# Patient Record
Sex: FEMALE | Race: BLACK OR AFRICAN AMERICAN | NOT HISPANIC OR LATINO
[De-identification: names, ages, dates, MRNs, and addresses within clinical notes are randomized per-mention and may not be internally consistent; named-entity substitution may affect disease eponyms.]

---

## 2017-04-23 ENCOUNTER — RESULT REVIEW (OUTPATIENT)
Age: 57
End: 2017-04-23

## 2017-04-25 ENCOUNTER — RESULT REVIEW (OUTPATIENT)
Age: 57
End: 2017-04-25

## 2018-12-03 ENCOUNTER — OUTPATIENT (OUTPATIENT)
Dept: OUTPATIENT SERVICES | Facility: HOSPITAL | Age: 58
LOS: 1 days | End: 2018-12-03

## 2018-12-03 VITALS
HEART RATE: 68 BPM | WEIGHT: 162.92 LBS | SYSTOLIC BLOOD PRESSURE: 124 MMHG | TEMPERATURE: 99 F | HEIGHT: 63 IN | RESPIRATION RATE: 18 BRPM | DIASTOLIC BLOOD PRESSURE: 82 MMHG

## 2018-12-03 DIAGNOSIS — K40.20 BILATERAL INGUINAL HERNIA, WITHOUT OBSTRUCTION OR GANGRENE, NOT SPECIFIED AS RECURRENT: ICD-10-CM

## 2018-12-03 DIAGNOSIS — M25.522 PAIN IN LEFT ELBOW: Chronic | ICD-10-CM

## 2018-12-03 DIAGNOSIS — Z98.51 TUBAL LIGATION STATUS: Chronic | ICD-10-CM

## 2018-12-03 LAB
BUN SERPL-MCNC: 21 MG/DL — SIGNIFICANT CHANGE UP (ref 7–23)
CALCIUM SERPL-MCNC: 9.9 MG/DL — SIGNIFICANT CHANGE UP (ref 8.4–10.5)
CHLORIDE SERPL-SCNC: 102 MMOL/L — SIGNIFICANT CHANGE UP (ref 98–107)
CO2 SERPL-SCNC: 26 MMOL/L — SIGNIFICANT CHANGE UP (ref 22–31)
CREAT SERPL-MCNC: 0.88 MG/DL — SIGNIFICANT CHANGE UP (ref 0.5–1.3)
GLUCOSE SERPL-MCNC: 84 MG/DL — SIGNIFICANT CHANGE UP (ref 70–99)
HCT VFR BLD CALC: 38.7 % — SIGNIFICANT CHANGE UP (ref 34.5–45)
HGB BLD-MCNC: 12 G/DL — SIGNIFICANT CHANGE UP (ref 11.5–15.5)
MCHC RBC-ENTMCNC: 26.1 PG — LOW (ref 27–34)
MCHC RBC-ENTMCNC: 31 % — LOW (ref 32–36)
MCV RBC AUTO: 84.3 FL — SIGNIFICANT CHANGE UP (ref 80–100)
NRBC # FLD: 0 — SIGNIFICANT CHANGE UP
PLATELET # BLD AUTO: 268 K/UL — SIGNIFICANT CHANGE UP (ref 150–400)
PMV BLD: 9.9 FL — SIGNIFICANT CHANGE UP (ref 7–13)
POTASSIUM SERPL-MCNC: 3.7 MMOL/L — SIGNIFICANT CHANGE UP (ref 3.5–5.3)
POTASSIUM SERPL-SCNC: 3.7 MMOL/L — SIGNIFICANT CHANGE UP (ref 3.5–5.3)
RBC # BLD: 4.59 M/UL — SIGNIFICANT CHANGE UP (ref 3.8–5.2)
RBC # FLD: 13.2 % — SIGNIFICANT CHANGE UP (ref 10.3–14.5)
SODIUM SERPL-SCNC: 142 MMOL/L — SIGNIFICANT CHANGE UP (ref 135–145)
WBC # BLD: 6.04 K/UL — SIGNIFICANT CHANGE UP (ref 3.8–10.5)
WBC # FLD AUTO: 6.04 K/UL — SIGNIFICANT CHANGE UP (ref 3.8–10.5)

## 2018-12-03 NOTE — H&P PST ADULT - NEGATIVE PSYCHIATRIC SYMPTOMS
no memory loss/no agitation/no auditory hallucinations/no hyperactivity/no visual hallucinations/no insomnia/no depression/no paranoia/no suicidal ideation

## 2018-12-03 NOTE — H&P PST ADULT - HISTORY OF PRESENT ILLNESS
59 y/o Black female with PMH: HTN, Hypothyroidism, Dyslipidemia presents to PST for pre op evaluation with h/o right groin pain and "felt a little bubble" summer 2018. Pt was evaluated by PCP who then referred her to surgeon. Pre op diagnosis: bilateral inguinal hernia without obstructions or gangrene. Now scheduled for bilateral inguinal hernia repair on 12/17/18

## 2018-12-03 NOTE — H&P PST ADULT - NEGATIVE ENMT SYMPTOMS
no tinnitus/no nasal congestion/no nasal obstruction/no post-nasal discharge/no nose bleeds/no abnormal taste sensation/no dry mouth/no recurrent cold sores/no hearing difficulty/no gum bleeding/no throat pain/no ear pain/no sinus symptoms/no nasal discharge/no dysphagia

## 2018-12-03 NOTE — H&P PST ADULT - RS GEN PE MLT RESP DETAILS PC
clear to auscultation bilaterally/no chest wall tenderness/breath sounds equal/airway patent airway patent/no chest wall tenderness/respirations non-labored/breath sounds equal/no intercostal retractions/no rales/no wheezes/no rhonchi/no subcutaneous emphysema/good air movement/clear to auscultation bilaterally

## 2018-12-03 NOTE — H&P PST ADULT - NEGATIVE CARDIOVASCULAR SYMPTOMS
no paroxysmal nocturnal dyspnea/no chest pain/no dyspnea on exertion/no orthopnea/no palpitations/no claudication

## 2018-12-03 NOTE — H&P PST ADULT - NEGATIVE GASTROINTESTINAL SYMPTOMS
no change in bowel habits/no hiccoughs/no vomiting/no diarrhea/no nausea no hiccoughs/no change in bowel habits/no melena/no diarrhea/no vomiting/no abdominal pain/no nausea

## 2018-12-03 NOTE — H&P PST ADULT - NEGATIVE OPHTHALMOLOGIC SYMPTOMS
no irritation R/no loss of vision L/no pain R/no diplopia/no photophobia/no blurred vision L/no blurred vision R/no lacrimation L/no lacrimation R/no discharge R/no discharge L/no pain L/no irritation L/no loss of vision R

## 2018-12-03 NOTE — H&P PST ADULT - NEGATIVE GENERAL SYMPTOMS
no polyphagia/no fatigue/no weight gain/no fever/no weight loss/no anorexia/no malaise/no chills/no sweating

## 2018-12-03 NOTE — H&P PST ADULT - PROBLEM SELECTOR PLAN 1
Scheduled for bilateral inguinal hernia repair on 12/17/18. Pre op instructions, famotidine, chlorhexidine gluconate soap given and explained. Pt verbalized understanding.

## 2018-12-03 NOTE — H&P PST ADULT - ACTIVITY
walk the dog 30 min 5 x week, pushing, lifting, pulling, walk up 3 flights of stairs several times a day at work

## 2018-12-16 ENCOUNTER — TRANSCRIPTION ENCOUNTER (OUTPATIENT)
Age: 58
End: 2018-12-16

## 2018-12-17 ENCOUNTER — RESULT REVIEW (OUTPATIENT)
Age: 58
End: 2018-12-17

## 2018-12-17 ENCOUNTER — OUTPATIENT (OUTPATIENT)
Dept: OUTPATIENT SERVICES | Facility: HOSPITAL | Age: 58
LOS: 1 days | Discharge: HOME | End: 2018-12-17
Payer: COMMERCIAL

## 2018-12-17 VITALS
SYSTOLIC BLOOD PRESSURE: 148 MMHG | WEIGHT: 162.92 LBS | RESPIRATION RATE: 14 BRPM | HEIGHT: 63 IN | TEMPERATURE: 98 F | DIASTOLIC BLOOD PRESSURE: 87 MMHG | OXYGEN SATURATION: 100 % | HEART RATE: 66 BPM

## 2018-12-17 VITALS
OXYGEN SATURATION: 96 % | DIASTOLIC BLOOD PRESSURE: 64 MMHG | SYSTOLIC BLOOD PRESSURE: 100 MMHG | RESPIRATION RATE: 16 BRPM | HEART RATE: 57 BPM

## 2018-12-17 DIAGNOSIS — Z98.51 TUBAL LIGATION STATUS: Chronic | ICD-10-CM

## 2018-12-17 DIAGNOSIS — M25.522 PAIN IN LEFT ELBOW: Chronic | ICD-10-CM

## 2018-12-17 DIAGNOSIS — K40.20 BILATERAL INGUINAL HERNIA, WITHOUT OBSTRUCTION OR GANGRENE, NOT SPECIFIED AS RECURRENT: ICD-10-CM

## 2018-12-17 PROCEDURE — 88304 TISSUE EXAM BY PATHOLOGIST: CPT | Mod: 26

## 2018-12-17 RX ORDER — SODIUM CHLORIDE 9 MG/ML
1000 INJECTION, SOLUTION INTRAVENOUS
Qty: 0 | Refills: 0 | Status: DISCONTINUED | OUTPATIENT
Start: 2018-12-17 | End: 2019-01-01

## 2018-12-17 RX ORDER — UBIDECARENONE 100 MG
1 CAPSULE ORAL
Qty: 0 | Refills: 0 | COMMUNITY

## 2018-12-17 RX ORDER — ASCORBIC ACID 60 MG
2 TABLET,CHEWABLE ORAL
Qty: 0 | Refills: 0 | COMMUNITY

## 2018-12-17 RX ORDER — FENTANYL CITRATE 50 UG/ML
50 INJECTION INTRAVENOUS
Qty: 0 | Refills: 0 | Status: DISCONTINUED | OUTPATIENT
Start: 2018-12-17 | End: 2018-12-17

## 2018-12-17 RX ORDER — HYDROMORPHONE HYDROCHLORIDE 2 MG/ML
0.5 INJECTION INTRAMUSCULAR; INTRAVENOUS; SUBCUTANEOUS
Qty: 0 | Refills: 0 | Status: DISCONTINUED | OUTPATIENT
Start: 2018-12-17 | End: 2018-12-17

## 2018-12-17 RX ORDER — EZETIMIBE 10 MG/1
1 TABLET ORAL
Qty: 0 | Refills: 0 | COMMUNITY

## 2018-12-17 RX ORDER — LINACLOTIDE 145 UG/1
1 CAPSULE, GELATIN COATED ORAL
Qty: 0 | Refills: 0 | COMMUNITY

## 2018-12-17 RX ORDER — MULTIVIT-MIN/FERROUS GLUCONATE 9 MG/15 ML
1 LIQUID (ML) ORAL
Qty: 0 | Refills: 0 | COMMUNITY

## 2018-12-17 RX ORDER — ONDANSETRON 8 MG/1
4 TABLET, FILM COATED ORAL ONCE
Qty: 0 | Refills: 0 | Status: COMPLETED | OUTPATIENT
Start: 2018-12-17 | End: 2018-12-17

## 2018-12-17 RX ORDER — LOSARTAN/HYDROCHLOROTHIAZIDE 100MG-25MG
1 TABLET ORAL
Qty: 0 | Refills: 0 | COMMUNITY

## 2018-12-17 RX ORDER — LEVOTHYROXINE SODIUM 125 MCG
1 TABLET ORAL
Qty: 0 | Refills: 0 | COMMUNITY

## 2018-12-17 RX ADMIN — FENTANYL CITRATE 50 MICROGRAM(S): 50 INJECTION INTRAVENOUS at 11:00

## 2018-12-17 RX ADMIN — HYDROMORPHONE HYDROCHLORIDE 0.5 MILLIGRAM(S): 2 INJECTION INTRAMUSCULAR; INTRAVENOUS; SUBCUTANEOUS at 09:47

## 2018-12-17 RX ADMIN — HYDROMORPHONE HYDROCHLORIDE 0.5 MILLIGRAM(S): 2 INJECTION INTRAMUSCULAR; INTRAVENOUS; SUBCUTANEOUS at 09:30

## 2018-12-17 RX ADMIN — SODIUM CHLORIDE 75 MILLILITER(S): 9 INJECTION, SOLUTION INTRAVENOUS at 09:15

## 2018-12-17 RX ADMIN — ONDANSETRON 4 MILLIGRAM(S): 8 TABLET, FILM COATED ORAL at 11:05

## 2018-12-17 RX ADMIN — FENTANYL CITRATE 50 MICROGRAM(S): 50 INJECTION INTRAVENOUS at 10:50

## 2018-12-17 NOTE — ASU DISCHARGE PLAN (ADULT/PEDIATRIC). - NURSING INSTRUCTIONS
call md for any increase in pain fever or unable to tolerate food or fluids. Keep operative sites clean and dry.

## 2018-12-17 NOTE — ASU DISCHARGE PLAN (ADULT/PEDIATRIC). - NOTIFY
Fever greater than 101/Pain not relieved by Medications/Swelling that continues/Bleeding that does not stop Inability to Tolerate Liquids or Foods/Increased Irritability or Sluggishness/Fever greater than 101/Persistent Nausea and Vomiting/Pain not relieved by Medications/Swelling that continues/Bleeding that does not stop

## 2018-12-17 NOTE — ASU DISCHARGE PLAN (ADULT/PEDIATRIC). - ITEMS TO FOLLOWUP WITH YOUR PHYSICIAN'S
PAIN: You may take Motrin 800mg every 6hrs and Percocet as needed for breakthrough every 4hrs.   WOUND: Please keep incisions clean and dry. Please do not scrub or rub incisions. Please to do not apply lotion or powder on incisions.   BATH: Please do not submerge wound under water. You may shower or use sponge bath.  ACTIVITY: No heavy lifting or straining until your follow up appointment. Otherwise, you may return to your usual level of physical activity. If you are taking narcotic pain medication (such as Percocet) DO NOT drive a car, operate machinery or make important decisions.  DIET: Return to your usual diet.  NOTIFY YOUR SURGEON IF: You have any bleeding that does not stop, any pus draining from your wound(s), any fever (over 100.4 F) or chills, persistent nausea/vomiting, persistent diarrhea, or if your pain is not controlled on your discharge pain medications.  FOLLOW-UP: Please follow-up with your surgeon, within 1-2 weeks following discharge- please call to schedule an appointment.

## 2018-12-17 NOTE — BRIEF OPERATIVE NOTE - PROCEDURE
<<-----Click on this checkbox to enter Procedure Bilateral inguinal hernia repair  12/17/2018    Active  [id number]

## 2018-12-17 NOTE — ASU DISCHARGE PLAN (ADULT/PEDIATRIC). - MEDICATION SUMMARY - MEDICATIONS TO TAKE
I will START or STAY ON the medications listed below when I get home from the hospital:    iron-vitron  -- 1 tab(s) by mouth once a day  -- Indication: For home med     Percocet 5/325 oral tablet  -- 1 tab(s) by mouth every 4 hours, As Needed -for moderate pain MDD:6 tabs  -- Caution federal law prohibits the transfer of this drug to any person other  than the person for whom it was prescribed.  May cause drowsiness.  Alcohol may intensify this effect.  Use care when operating dangerous machinery.  This prescription cannot be refilled.  This product contains acetaminophen.  Do not use  with any other product containing acetaminophen to prevent possible liver damage.  Using more of this medication than prescribed may cause serious breathing problems.    -- Indication: For post-op pain    ezetimibe 10 mg oral tablet  -- 1 tab(s) by mouth once a day  -- Indication: For home med     losartan-hydroCHLOROthiazide 100 mg-25 mg oral tablet  -- 1 tab(s) by mouth once a day  -- Indication: For home med     Linzess 145 mcg oral capsule  -- 1 cap(s) by mouth once a day, As Needed  -- Indication: For home med     CoQ10 300 mg oral capsule  -- 1 cap(s) by mouth once a day  -- Indication: For home med     Synthroid 175 mcg (0.175 mg) oral tablet  -- 1 tab(s) by mouth once a day  -- Indication: For home med     Centrum Women's oral tablet  -- 1 tab(s) by mouth once a day; last dose 12/07/18  -- Indication: For home med    Vitamin C 1000 mg oral tablet, chewable  -- 2 tab(s) by mouth 2 times a week  -- Indication: For home med I will START or STAY ON the medications listed below when I get home from the hospital:    iron-vitron  -- 1 tab(s) by mouth once a day  -- Indication: For home med     Percocet 5/325 oral tablet  -- 1 tab(s) by mouth every 4 hours, As Needed -for moderate pain MDD:6 tabs  -- Caution federal law prohibits the transfer of this drug to any person other  than the person for whom it was prescribed.  May cause drowsiness.  Alcohol may intensify this effect.  Use care when operating dangerous machinery.  This prescription cannot be refilled.  This product contains acetaminophen.  Do not use  with any other product containing acetaminophen to prevent possible liver damage.  Using more of this medication than prescribed may cause serious breathing problems.    -- Indication: For post-op pain     ezetimibe 10 mg oral tablet  -- 1 tab(s) by mouth once a day  -- Indication: For home med     losartan-hydroCHLOROthiazide 100 mg-25 mg oral tablet  -- 1 tab(s) by mouth once a day  -- Indication: For home med     Linzess 145 mcg oral capsule  -- 1 cap(s) by mouth once a day, As Needed  -- Indication: For home med     CoQ10 300 mg oral capsule  -- 1 cap(s) by mouth once a day  -- Indication: For home med     Synthroid 175 mcg (0.175 mg) oral tablet  -- 1 tab(s) by mouth once a day  -- Indication: For home med     Centrum Women's oral tablet  -- 1 tab(s) by mouth once a day; last dose 12/07/18  -- Indication: For home med    Vitamin C 1000 mg oral tablet, chewable  -- 2 tab(s) by mouth 2 times a week  -- Indication: For home med

## 2018-12-17 NOTE — BRIEF OPERATIVE NOTE - POST-OP DX
Non-recurrent bilateral inguinal hernia without obstruction or gangrene  12/17/2018    Active  Geoffrey Cadena

## 2018-12-20 LAB — SURGICAL PATHOLOGY STUDY: SIGNIFICANT CHANGE UP

## 2019-05-07 ENCOUNTER — OFFICE VISIT (OUTPATIENT)
Dept: INTERNAL MEDICINE CLINIC | Age: 59
End: 2019-05-07

## 2019-05-07 VITALS — BODY MASS INDEX: 29.23 KG/M2 | WEIGHT: 165 LBS | HEIGHT: 63 IN

## 2019-05-07 DIAGNOSIS — E03.9 ACQUIRED HYPOTHYROIDISM: ICD-10-CM

## 2019-05-07 DIAGNOSIS — E78.00 PURE HYPERCHOLESTEROLEMIA: ICD-10-CM

## 2019-05-07 DIAGNOSIS — K63.5 POLYP OF COLON, UNSPECIFIED PART OF COLON, UNSPECIFIED TYPE: ICD-10-CM

## 2019-05-07 DIAGNOSIS — K59.09 CHRONIC CONSTIPATION: ICD-10-CM

## 2019-05-07 DIAGNOSIS — Z11.59 NEED FOR HEPATITIS C SCREENING TEST: ICD-10-CM

## 2019-05-07 DIAGNOSIS — I10 ESSENTIAL HYPERTENSION: Primary | ICD-10-CM

## 2019-05-07 RX ORDER — EZETIMIBE 10 MG/1
10 TABLET ORAL DAILY
COMMUNITY
End: 2019-06-11 | Stop reason: SDUPTHER

## 2019-05-07 RX ORDER — LOSARTAN POTASSIUM AND HYDROCHLOROTHIAZIDE 25; 100 MG/1; MG/1
1 TABLET ORAL DAILY
COMMUNITY
End: 2020-05-22 | Stop reason: SDUPTHER

## 2019-05-07 RX ORDER — LEVOTHYROXINE SODIUM 175 UG/1
175 TABLET ORAL
COMMUNITY
End: 2019-07-30 | Stop reason: SDUPTHER

## 2019-05-07 RX ORDER — ASCORBIC ACID 500 MG
1000 TABLET ORAL AS NEEDED
COMMUNITY

## 2019-05-07 NOTE — PROGRESS NOTES
Health Maintenance Due   Topic Date Due    Hepatitis C Screening  1960    DTaP/Tdap/Td series (1 - Tdap) 08/10/1981    PAP AKA CERVICAL CYTOLOGY  08/10/1981    Shingrix Vaccine Age 50> (1 of 2) 08/10/2010    BREAST CANCER SCRN MAMMOGRAM  08/10/2010    FOBT Q 1 YEAR AGE 50-75  08/10/2010       Chief Complaint   Patient presents with   [address] pt       1. Have you been to the ER, urgent care clinic since your last visit? Hospitalized since your last visit? Yes When: 2 wks ago [facility] for ear pain    2. Have you seen or consulted any other health care providers outside of the [address] [place] [address] since your last visit? Include any pap smears or colon screening. No    3) Do you have an Advance Directive on file? no    4) Are you interested in receiving information on Advance Directives? NO      Patient is accompanied by self I have received verbal consent from Elisabet Rincon to discuss any/all medical information while they are present in the room.

## 2019-05-07 NOTE — PROGRESS NOTES
HISTORY OF PRESENT ILLNESS  Simran Ventura is a 62 y.o. female. New patient who comes in to [facility]. Moved to [place] from [place] to be close to her children. Lives with her . Has a few chronic medical issues as documented before. NKA. On Linzess for chronic constipation. Has some arthritic pains. BP is stable. Med list reviewed with patient. Denies tobacco or alcohol use. Needs labs. No other complaints. HPI    Review of Systems   Constitutional: Negative. HENT: Negative. Eyes: Negative. Respiratory: Negative. Cardiovascular: Negative. Gastrointestinal: Positive for constipation. Negative for abdominal pain, blood in stool and melena. Genitourinary: Negative. Musculoskeletal: Positive for joint pain. Skin: Negative. Neurological: Negative. Endo/Heme/Allergies: Negative. Psychiatric/Behavioral: Negative. Physical Exam   Constitutional: She is oriented to person, place, and time. She appears well-developed and well-nourished. No distress. Pleasant overweight lady   HENT:   Head: Normocephalic and atraumatic. Mouth/Throat: Oropharynx is clear and moist.   Eyes: Pupils are equal, round, and reactive to light. Conjunctivae and EOM are normal.   Small cataracts   Neck: Normal range of motion. Neck supple. No JVD present. No thyromegaly present. Cardiovascular: Normal rate, regular rhythm, normal heart sounds and intact distal pulses. No murmur heard. Pulmonary/Chest: Effort normal and breath sounds normal. No respiratory distress. She has no wheezes. She has no rales. Abdominal: Soft. Bowel sounds are normal. She exhibits no distension. There is no tenderness. Musculoskeletal: She exhibits no edema or tenderness. Neurological: She is alert and oriented to person, place, and time. Coordination normal.   Skin: Skin is warm and dry. No rash noted. Psychiatric: She has a normal mood and affect.  Her behavior is normal.   Nursing note and vitals reviewed. ASSESSMENT and PLAN  Diagnoses and all orders for this visit:    1. Essential hypertension  -     LIPID PANEL  -     METABOLIC PANEL, COMPREHENSIVE  -     CBC W/O DIFF    2. Pure hypercholesterolemia  -     LIPID PANEL  -     METABOLIC PANEL, COMPREHENSIVE  -     CBC W/O DIFF    3. Acquired hypothyroidism  -     TSH 3RD GENERATION    4. Chronic constipation    5. Polyp of colon, unspecified part of colon, unspecified type    6. Need for hepatitis C screening test  -     HEPATITIS C AB      Follow-up and Dispositions    · Return in about 6 months (around 11/7/2019).      lab results and schedule of future lab studies reviewed with patient  reviewed diet, exercise and weight control  reviewed medications and side effects in detail  Overall stable

## 2019-05-08 LAB
ALBUMIN SERPL-MCNC: 4.5 G/DL (ref 3.5–5.5)
ALBUMIN/GLOB SERPL: 1.4 {RATIO} (ref 1.2–2.2)
ALP SERPL-CCNC: 57 IU/L (ref 39–117)
ALT SERPL-CCNC: 12 IU/L (ref 0–32)
AST SERPL-CCNC: 17 IU/L (ref 0–40)
BILIRUB SERPL-MCNC: 0.3 MG/DL (ref 0–1.2)
BUN SERPL-MCNC: 12 MG/DL (ref 6–24)
BUN/CREAT SERPL: 14 (ref 9–23)
CALCIUM SERPL-MCNC: 10.2 MG/DL (ref 8.7–10.2)
CHLORIDE SERPL-SCNC: 100 MMOL/L (ref 96–106)
CHOLEST SERPL-MCNC: 215 MG/DL (ref 100–199)
CO2 SERPL-SCNC: 27 MMOL/L (ref 20–29)
CREAT SERPL-MCNC: 0.87 MG/DL (ref 0.57–1)
ERYTHROCYTE [DISTWIDTH] IN BLOOD BY AUTOMATED COUNT: 14.6 % (ref 12.3–15.4)
GLOBULIN SER CALC-MCNC: 3.3 G/DL (ref 1.5–4.5)
GLUCOSE SERPL-MCNC: 82 MG/DL (ref 65–99)
HCT VFR BLD AUTO: 38.7 % (ref 34–46.6)
HCV AB S/CO SERPL IA: <0.1 S/CO RATIO (ref 0–0.9)
HDLC SERPL-MCNC: 67 MG/DL
HGB BLD-MCNC: 12.5 G/DL (ref 11.1–15.9)
INTERPRETATION, 910389: NORMAL
LDLC SERPL CALC-MCNC: 132 MG/DL (ref 0–99)
MCH RBC QN AUTO: 26.6 PG (ref 26.6–33)
MCHC RBC AUTO-ENTMCNC: 32.3 G/DL (ref 31.5–35.7)
MCV RBC AUTO: 82 FL (ref 79–97)
PLATELET # BLD AUTO: 268 X10E3/UL (ref 150–379)
POTASSIUM SERPL-SCNC: 4.1 MMOL/L (ref 3.5–5.2)
PROT SERPL-MCNC: 7.8 G/DL (ref 6–8.5)
RBC # BLD AUTO: 4.7 X10E6/UL (ref 3.77–5.28)
SODIUM SERPL-SCNC: 142 MMOL/L (ref 134–144)
TRIGL SERPL-MCNC: 82 MG/DL (ref 0–149)
TSH SERPL DL<=0.005 MIU/L-ACNC: 0.18 UIU/ML (ref 0.45–4.5)
VLDLC SERPL CALC-MCNC: 16 MG/DL (ref 5–40)
WBC # BLD AUTO: 5.4 X10E3/UL (ref 3.4–10.8)

## 2019-05-14 NOTE — PROGRESS NOTES
Low TSH  Take same dose of Synthroid daily except for half on Sundays  Recheck TSH and free T4 in weeks    Borderline high cholesterol ---- watch fatty food/exercise    All other labs are stable

## 2019-05-21 ENCOUNTER — RESULT REVIEW (OUTPATIENT)
Age: 59
End: 2019-05-21

## 2019-06-11 DIAGNOSIS — E03.9 ACQUIRED HYPOTHYROIDISM: Primary | ICD-10-CM

## 2019-06-11 NOTE — PROGRESS NOTES
Called and verified pt with name and . Informed pt of results and MD recommendations. Pt verbalized understanding and is aware that she will be receiving a lab letter and [facility] requisition in the mail. Pt had no further questions at this time.

## 2019-06-13 RX ORDER — EZETIMIBE 10 MG/1
10 TABLET ORAL DAILY
Qty: 90 TAB | Refills: 1 | Status: SHIPPED | OUTPATIENT
Start: 2019-06-13 | End: 2020-04-01 | Stop reason: SDUPTHER

## 2019-07-30 RX ORDER — LEVOTHYROXINE SODIUM 175 UG/1
175 TABLET ORAL
Qty: 90 TAB | Refills: 1 | Status: SHIPPED | OUTPATIENT
Start: 2019-07-30 | End: 2019-11-12 | Stop reason: DRUGHIGH

## 2019-07-30 NOTE — TELEPHONE ENCOUNTER
----- Message from [employer] sent at 7/30/2019  1:01 PM EDT -----  Regarding: Dr. Leandro Benavidez / Rx refill  Contact: [phone]  Medication Refill    Caller (if not patient):      Relationship of caller (if not patient):      Best contact number(s): [phone]      Name of medication and dosage if known: L-Thyroxine 175 mcgs      Is patient out of this medication (yes/no): no       Pharmacy name: [facility]. listed in chart? (yes/no):yes  Pharmacy phone number:[phone]       Details to clarify the request: Pt requested a Rx refill on L-Thyroxine 175 mcgs, 90 days supply called into [facility] @ [phone].       [facility]

## 2019-11-11 ENCOUNTER — OFFICE VISIT (OUTPATIENT)
Dept: INTERNAL MEDICINE CLINIC | Age: 59
End: 2019-11-11

## 2019-11-11 ENCOUNTER — HOSPITAL ENCOUNTER (OUTPATIENT)
Dept: MAMMOGRAPHY | Age: 59
Discharge: HOME | End: 2019-11-11
Attending: INTERNAL MEDICINE
Payer: COMMERCIAL

## 2019-11-11 ENCOUNTER — TELEPHONE (OUTPATIENT)
Dept: INTERNAL MEDICINE CLINIC | Age: 59
End: 2019-11-11

## 2019-11-11 VITALS
DIASTOLIC BLOOD PRESSURE: 80 MMHG | HEART RATE: 53 BPM | HEIGHT: 63 IN | RESPIRATION RATE: 16 BRPM | WEIGHT: 165.8 LBS | BODY MASS INDEX: 29.38 KG/M2 | SYSTOLIC BLOOD PRESSURE: 120 MMHG | OXYGEN SATURATION: 98 % | TEMPERATURE: 97.7 F

## 2019-11-11 DIAGNOSIS — D21.9 FIBROIDS: Primary | ICD-10-CM

## 2019-11-11 DIAGNOSIS — Z23 ENCOUNTER FOR IMMUNIZATION: ICD-10-CM

## 2019-11-11 DIAGNOSIS — Z12.31 VISIT FOR SCREENING MAMMOGRAM: ICD-10-CM

## 2019-11-11 DIAGNOSIS — E03.9 ACQUIRED HYPOTHYROIDISM: ICD-10-CM

## 2019-11-11 DIAGNOSIS — D21.9 FIBROIDS: ICD-10-CM

## 2019-11-11 DIAGNOSIS — I10 ESSENTIAL HYPERTENSION: Primary | ICD-10-CM

## 2019-11-11 DIAGNOSIS — Z12.31 ENCOUNTER FOR SCREENING MAMMOGRAM FOR BREAST CANCER: ICD-10-CM

## 2019-11-11 DIAGNOSIS — E78.00 PURE HYPERCHOLESTEROLEMIA: ICD-10-CM

## 2019-11-11 DIAGNOSIS — K59.09 CHRONIC CONSTIPATION: ICD-10-CM

## 2019-11-11 DIAGNOSIS — Z01.419 WELL WOMAN EXAM: ICD-10-CM

## 2019-11-11 PROCEDURE — 77067 SCR MAMMO BI INCL CAD: CPT

## 2019-11-11 NOTE — PROGRESS NOTES
Josue Damon is a 61 y.o. female  who presents for routine immunization(s). Patient denies any symptoms , reactions or allergies that would exclude them from being immunized today. Risks and adverse reactions were discussed and the VIS was given to them. Patient voiced full understanding and signed Adult Immunization Consent form. All questions were addressed. Patient was observed for 10 min post injection. There were no reactions observed.     [facility]

## 2019-11-11 NOTE — PROGRESS NOTES
Health Maintenance Due   Topic Date Due    DTaP/Tdap/Td series (1 - Tdap) 08/10/1981    PAP AKA CERVICAL CYTOLOGY  08/10/1981    Shingrix Vaccine Age 50> (1 of 2) 08/10/2010    BREAST CANCER SCRN MAMMOGRAM  08/10/2010    FOBT Q 1 YEAR AGE 50-75  08/10/2010    Influenza Age 5 to Adult  08/01/2019       Chief Complaint   Patient presents with    Hypertension     F/U Care    Hypothyroidism    Cholesterol Problem    Vaginal Bleeding     Spotting-Pt seeing a GYN 11/11/2019       1. Have you been to the ER, urgent care clinic since your last visit? Hospitalized since your last visit? Yes When: 6/2019 to [facility] for ear infections    2. Have you seen or consulted any other health care providers outside of the [address] [place] [address] since your last visit? Include any pap smears or colon screening. No    3) Do you have an Advance Directive on file? no    4) Are you interested in receiving information on Advance Directives? NO      Patient is accompanied by self I have received verbal consent from Gabby Hooks to discuss any/all medical information while they are present in the room.

## 2019-11-11 NOTE — PROGRESS NOTES
HISTORY OF PRESENT ILLNESS  Sandrita Willett is a 61 y.o. female. Pt. comes in for f/u. Has a few chronic medical issues as documented. BP is stable. Followed by GYN for vaginal bleeding. Has fibroids. Denies any other neurological issues. Has chronic constipation. Talat Cerda is helping. Thyroid and cholesterol issues have been stable. No other new complaints. PMH/PSH/Allergies/Social History/medication list and most recent studies reviewed with patient. Tobacco use: No  Alcohol use: Social    Reports compliance with medications and diet. Trying to be active physically to control weight. Reports no other new c/o. HPI    Review of Systems   Constitutional: Negative. HENT: Negative. Eyes: Negative. Respiratory: Negative. Cardiovascular: Negative. Gastrointestinal: Positive for constipation. Negative for abdominal pain. Genitourinary: Negative. Musculoskeletal: Positive for joint pain. Skin: Negative. Neurological: Negative. Endo/Heme/Allergies: Negative. Psychiatric/Behavioral: Negative. Physical Exam   Constitutional: She is oriented to person, place, and time. She appears well-developed and well-nourished. No distress. Pleasant overweight lady   HENT:   Head: Normocephalic and atraumatic. Mouth/Throat: Oropharynx is clear and moist.   Eyes: Conjunctivae are normal.   Small cataracts   Neck: Normal range of motion. Neck supple. No JVD present. No thyromegaly present. Cardiovascular: Normal rate, regular rhythm, normal heart sounds and intact distal pulses. No murmur heard. Pulmonary/Chest: Effort normal and breath sounds normal. No respiratory distress. She has no wheezes. She has no rales. Abdominal: Soft. Bowel sounds are normal. She exhibits no distension. There is no tenderness. Musculoskeletal: She exhibits no edema or tenderness. Neurological: She is alert and oriented to person, place, and time.  Coordination normal.   Skin: Skin is warm and dry. No rash noted. Psychiatric: She has a normal mood and affect. Her behavior is normal.   Nursing note and vitals reviewed. ASSESSMENT and PLAN  Diagnoses and all orders for this visit:    1. Essential hypertension    2. Acquired hypothyroidism  -     T4, FREE  -     TSH 3RD GENERATION    3. Encounter for immunization  -     INFLUENZA VIRUS VAC QUAD,SPLIT,PRESV FREE SYRINGE IM  -     MD IMMUNIZ ADMIN,1 SINGLE/COMB VAC/TOXOID    4. Pure hypercholesterolemia    5. Chronic constipation    6. Fibroids    7. Encounter for screening mammogram for breast cancer  -     [facility] MAMMO BI SCREENING INCL CAD; Future      Follow-up and Dispositions    · Return in about 6 months (around 5/11/2020).      lab results and schedule of future lab studies reviewed with patient  reviewed diet, exercise and weight control  reviewed medications and side effects in detail  Advised patient to lose weight by watching diet (decreasing sugars/carbs/fat, increasing fruits/vegetables), exercising at least 30 minutes daily, getting 7-8 hours of sleep daily, drinking plenty of water, and decreasing stress  F/u with other MD's as scheduled  Overall stable

## 2019-11-11 NOTE — TELEPHONE ENCOUNTER
Returned the pt's call and left a VM with the name and contact information for Dr. Wojciech Resendez with [facility].

## 2019-11-12 DIAGNOSIS — E03.9 ACQUIRED HYPOTHYROIDISM: Primary | ICD-10-CM

## 2019-11-12 LAB
T4 FREE SERPL-MCNC: 1.34 NG/DL (ref 0.82–1.77)
TSH SERPL DL<=0.005 MIU/L-ACNC: 4.68 UIU/ML (ref 0.45–4.5)

## 2019-11-12 RX ORDER — LEVOTHYROXINE SODIUM 200 UG/1
200 TABLET ORAL
Qty: 30 TAB | Refills: 2 | Status: SHIPPED | OUTPATIENT
Start: 2019-11-12 | End: 2019-12-09 | Stop reason: SDUPTHER

## 2019-12-09 DIAGNOSIS — E03.9 ACQUIRED HYPOTHYROIDISM: ICD-10-CM

## 2019-12-09 RX ORDER — LEVOTHYROXINE SODIUM 200 UG/1
200 TABLET ORAL
Qty: 90 TAB | Refills: 1 | Status: SHIPPED | OUTPATIENT
Start: 2019-12-09 | End: 2019-12-26 | Stop reason: SDUPTHER

## 2019-12-09 NOTE — TELEPHONE ENCOUNTER
Requested Prescriptions     Pending Prescriptions Disp Refills    levothyroxine (SYNTHROID) 200 mcg tablet 30 Tab 2     Sig: Take 1 Tab by mouth Daily (before breakfast). [facility] requesting 90 day supplies.   11/11/2019 05/11/2020

## 2019-12-10 ENCOUNTER — OFFICE VISIT (OUTPATIENT)
Dept: OBGYN CLINIC | Age: 59
End: 2019-12-10

## 2019-12-10 VITALS
WEIGHT: 170 LBS | DIASTOLIC BLOOD PRESSURE: 88 MMHG | SYSTOLIC BLOOD PRESSURE: 140 MMHG | BODY MASS INDEX: 30.12 KG/M2 | HEIGHT: 63 IN

## 2019-12-10 DIAGNOSIS — N95.0 POSTMENOPAUSAL BLEEDING: Primary | ICD-10-CM

## 2019-12-10 DIAGNOSIS — Z11.51 SCREENING FOR HUMAN PAPILLOMAVIRUS: ICD-10-CM

## 2019-12-10 DIAGNOSIS — Z01.419 WELL WOMAN EXAM: ICD-10-CM

## 2019-12-10 NOTE — PROGRESS NOTES
Problem Visit    Lilliam Shaw is a 61 y.o. postmenopausal female  A0 presenting for evaluation of PMB. Single episode of light bleeding last month that lasted for about 1 week with some cramping. H/o fibroids, one was as large as an orange. Also experiencing vaginal dryness. Also with intermittent pelvic bloating. H/o bilateral inguinal hernia repairs about 1 year ago. Pt recently moved to [facility] from [place] to live closer to her 2 daughters. Ob/Gyn Hx:   A0 -3   [place]-57  VMS-denies  Vag dryness-present  STI- denies  ? SA-yes    Health maintenance:  [id number], normal per patient, no history of abnormals  Mammo-19 B1  Colonoscopy- , h/o polyps    Past Medical History:   Diagnosis Date    Fibroids     Hypercholesterolemia     Hypertension     Hypothyroid     Inguinal hernia bilateral, non-recurrent     Pregnancy     x 3 all vaginal births       Past Surgical History:   Procedure Laterality Date    HX HERNIA REPAIR  2018    Bilateral inguinal    HX ORTHOPAEDIC  2007    Left elbow surgery    HX TUBAL LIGATION         Family History   Problem Relation Age of Onset    Diabetes Mother     Heart Disease Mother     Heart Disease Father     Lung Cancer Maternal Grandmother        Social History     Socioeconomic History    Marital status:      Spouse name: Not on file    Number of children: Not on file    Years of education: Not on file    Highest education level: Not on file   Occupational History    Not on file   Social Needs    Financial resource strain: Not on file    Food insecurity:     Worry: Not on file     Inability: Not on file    Transportation needs:     Medical: Not on file     Non-medical: Not on file   Tobacco Use    Smoking status: Never Smoker    Smokeless tobacco: Never Used   Substance and Sexual Activity    Alcohol use:  Yes     Alcohol/week: 2.0 - 4.0 standard drinks     Types: 1 - 2 Glasses of wine, 1 - 2 Cans of beer per week     Comment: Social/1-2 weekly    Drug use: Never    Sexual activity: Yes     Partners: Male     Birth control/protection: None   Lifestyle    Physical activity:     Days per week: Not on file     Minutes per session: Not on file    Stress: Not on file   Relationships    Social connections:     Talks on phone: Not on file     Gets together: Not on file     Attends Restorationist service: Not on file     Active member of club or organization: Not on file     Attends meetings of clubs or organizations: Not on file     Relationship status: Not on file    Intimate partner violence:     Fear of current or ex partner: Not on file     Emotionally abused: Not on file     Physically abused: Not on file     Forced sexual activity: Not on file   Other Topics Concern    Not on file   Social History Narrative    Not on file       Current Outpatient Medications   Medication Sig Dispense Refill    levothyroxine (SYNTHROID) 200 mcg tablet Take 1 Tab by mouth Daily (before breakfast). 90 Tab 1    ezetimibe (ZETIA) 10 mg tablet Take 1 Tab by mouth daily. 90 Tab 1    losartan-hydroCHLOROthiazide (HYZAAR) 100-25 mg per tablet Take 1 Tab by mouth daily.  multivit,iron,minerals/lutein (CENTRUM SILVER ULTRA WOMEN'S PO) Take 1 Tab by mouth daily.  linaclotide (LINZESS) 145 mcg cap capsule Take 145 mcg by mouth BID Mon Wed & Fri. Indications: PRN      ascorbic acid, vitamin C, (VITAMIN C) 500 mg tablet Take 1,000 mg by mouth as needed.          No Known Allergies    Review of Systems - History obtained from the patient  Constitutional: negative for weight loss, fever, night sweats  HEENT: negative for hearing loss, earache, congestion, snoring, sorethroat  CV: negative for chest pain, palpitations, edema  Resp: negative for cough, shortness of breath, wheezing  GI: negative for change in bowel habits, abdominal pain, black or bloody stools  : negative for frequency, dysuria, hematuria, vaginal discharge, +PMB  MSK: negative for back pain, joint pain, muscle pain  Breast: negative for breast lumps, nipple discharge, galactorrhea  Skin :negative for itching, rash, hives  Neuro: negative for dizziness, headache, confusion, weakness  Psych: negative for anxiety, depression, change in mood  Heme/lymph: negative for bleeding, bruising, pallor    Physical Exam  Visit Vitals  /88 (BP 1 Location: Left arm, BP Patient Position: Sitting)   Ht 5' 3\" (1.6 m)   Wt 170 lb (77.1 kg)   BMI 30.11 kg/m²     Constitutional  · Appearance: well-nourished, well developed, alert, in no acute distress    HENT  · Head and Face: appears normal    Chest  · Respiratory Effort: non-labored breathing  · Auscultation: CTAB, normal breath sounds    Cardiovascular  · Heart:  · Auscultation: regular rate and rhythm without murmur  · Extremities: no peripheral edema    Gastrointestinal  · Abdominal Examination: abdomen non-tender to palpation, normal bowel sounds, no masses present  · Liver and spleen: no hepatomegaly present, spleen not palpable  · Hernias: no hernias identified    Genitourinary  · External Genitalia: normal appearance for age, no discharge present, no tenderness present, no inflammatory lesions present, no masses present, no atrophy present  · Vagina: normal vaginal vault without central or paravaginal defects, no discharge present, no inflammatory lesions present, no masses present  · Bladder: non-tender to palpation  · Urethra: appears normal  · Cervix: normal, small cystic structure at 6-7 o'clock position on cervix (?nabothian cyst vs. small cervical fibroid, overall benign appearanace)  · Uterus: ?  Slightly enlarged, mobile, non-tender (known h/o fibroids)  · Adnexa: no adnexal tenderness present, no adnexal masses appreciated  · Perineum: perineum within normal limits, no evidence of trauma, no rashes or skin lesions present    Skin  · General Inspection: no rash, no lesions identified    Neurologic/Psychiatric  · Mental Status:  · Orientation: grossly oriented to person, place and time  · Mood and Affect: mood normal, affect appropriate      Assessment/Plan:  61 y.o. presenting for evaluation of PMB. H/o fibroids.     -TVUS  -discussed possible EMBx and cervical biopsy at follow up visit    RTC: 1-2 wks for Val Stuart MD  12/10/2019  2:37 PM

## 2019-12-10 NOTE — PATIENT INSTRUCTIONS
Vaginal Bleeding After Menopause: Care Instructions  Your Care Instructions    Vaginal bleeding after menopause can have many causes. Causes may include infection, inflammation, prescription hormones, abnormal growths, and injury. Your doctor may want you to have more tests to find the cause of your vaginal bleeding. Follow-up care is a key part of your treatment and safety. Be sure to make and go to all appointments, and call your doctor if you are having problems. It's also a good idea to know your test results and keep a list of the medicines you take. How can you care for yourself at home? · If your doctor gave you medicine, take it exactly as prescribed. Call your doctor if you think you are having a problem with your medicine. · Do not have sex or put anything inside your vagina until you talk with your doctor. · Do not douche. When should you call for help? Call 911 anytime you think you may need emergency care. For example, call if:    · You passed out (lost consciousness).    Call your doctor now or seek immediate medical care if:    · You have severe vaginal bleeding.     · You are dizzy or lightheaded, or you feel like you may faint.     · You have new or worse belly or pelvic pain.    Watch closely for changes in your health, and be sure to contact your doctor if:    · Your bleeding gets worse.     · You think you might be pregnant.     · You do not get better as expected. Where can you learn more? Go to http://dillon-melissa.info/. Enter N304 in the search box to learn more about \"Vaginal Bleeding After Menopause: Care Instructions. \"  Current as of: February 19, 2019  Content Version: 12.2  © 2777-7147 [employer], Incorporated. Care instructions adapted under license by [employer] (which disclaims liability or warranty for this information).  If you have questions about a medical condition or this instruction, always ask your healthcare professional. [employer] disclaims any warranty or liability for your use of this information.

## 2019-12-13 LAB
CYTOLOGIST CVX/VAG CYTO: NORMAL
CYTOLOGY CVX/VAG DOC CYTO: NORMAL
CYTOLOGY CVX/VAG DOC THIN PREP: NORMAL
DX ICD CODE: NORMAL
HPV I/H RISK 1 DNA CVX QL PROBE+SIG AMP: NEGATIVE
Lab: NORMAL
OTHER STN SPEC: NORMAL
STAT OF ADQ CVX/VAG CYTO-IMP: NORMAL

## 2019-12-18 NOTE — PROGRESS NOTES
Letter mailed to patient with results and recommendations from provider and MyChart message sent with results and recommendations as well.

## 2019-12-23 DIAGNOSIS — E03.9 ACQUIRED HYPOTHYROIDISM: ICD-10-CM

## 2019-12-26 DIAGNOSIS — E03.9 ACQUIRED HYPOTHYROIDISM: ICD-10-CM

## 2019-12-26 RX ORDER — LEVOTHYROXINE SODIUM 200 UG/1
200 TABLET ORAL
Qty: 90 TAB | Refills: 1 | Status: SHIPPED | OUTPATIENT
Start: 2019-12-26 | End: 2020-05-22 | Stop reason: SDUPTHER

## 2020-01-03 ENCOUNTER — OFFICE VISIT (OUTPATIENT)
Dept: OBGYN CLINIC | Age: 60
End: 2020-01-03

## 2020-01-03 VITALS
DIASTOLIC BLOOD PRESSURE: 74 MMHG | SYSTOLIC BLOOD PRESSURE: 116 MMHG | WEIGHT: 169 LBS | BODY MASS INDEX: 29.95 KG/M2 | HEIGHT: 63 IN

## 2020-01-03 DIAGNOSIS — N95.0 PMB (POSTMENOPAUSAL BLEEDING): Primary | ICD-10-CM

## 2020-01-03 NOTE — PROGRESS NOTES
Problem Visit    Jonas Perez is a 61 y.o. postmenopausal female  A0 presenting for ultrasound and follow up evaluation of PMB. Single episode of light bleeding last month that lasted for about 1 week with some cramping. H/o fibroids, one was as large as an orange. Also experiencing vaginal dryness. Also with intermittent pelvic bloating. H/o bilateral inguinal hernia repairs about 1 year ago. No vaginal bleeding since her last office visit just mild cramping. Pt recently moved to [place] from [place] to live closer to her 2 daughters. TRANSVAGINAL ULTRASOUND PERFORMED 1/3/20. THE UTERUS IS ANTEVERTED,ENALRGED IN SIZE AND HETEROGENEOUS IN ECHOGENICITY. THERE  APPEARS TO BE MULTIPLE FIBROIDS SEEN. LARGEST ARE NOTED. RT LATERAL INTRAMURAL 44 X 44MM. LT LATERAL FUNDAL 33 X 30MM. INTRAMURAL VS. SUBMUCOSAL VS. INTRACAVITARY 25 X 26MM. LT  FUNDAL CALCIFIED 26 X 16MM. ML INTRAMURAL 27 X 27MM. UNABLE TO DETERMINE ENDOMETRIUM DUE TO MULTIPLE FIBROIDS AND FIBROID SHADOWING. BILATERAL OVARIES ARE NOT VISUALIZED DUE TO BOWEL GAS. RT AND LT ADNEXAS APPEAR WNL. NO FREE FLUID SEEN IN THE CDS. Ob/Gyn Hx:   A0 - 3   [place]-57  VMS-denies  Vag dryness-present  STI- denies  ? SA-yes    Health maintenance:  Pap-12/10/19 NILM HPV Neg  Mammo-19 B1  Colonoscopy- , h/o polyps    Past Medical History:   Diagnosis Date    Fibroids     Hypercholesterolemia     Hypertension     Hypothyroid     Inguinal hernia bilateral, non-recurrent     Pregnancy     x 3 all vaginal births       Past Surgical History:   Procedure Laterality Date    HX HERNIA REPAIR  2018    Bilateral inguinal    HX ORTHOPAEDIC      Left elbow surgery    HX TUBAL LIGATION         Family History   Problem Relation Age of Onset    Diabetes Mother     Heart Disease Mother     Heart Disease Father     Lung Cancer Maternal Grandmother        Social History     Socioeconomic History    Marital status:      Spouse name: Not on file    Number of children: Not on file    Years of education: Not on file    Highest education level: Not on file   Occupational History    Not on file   Social Needs    Financial resource strain: Not on file    Food insecurity:     Worry: Not on file     Inability: Not on file    Transportation needs:     Medical: Not on file     Non-medical: Not on file   Tobacco Use    Smoking status: Never Smoker    Smokeless tobacco: Never Used   Substance and Sexual Activity    Alcohol use: Yes     Alcohol/week: 2.0 - 4.0 standard drinks     Types: 1 - 2 Glasses of wine, 1 - 2 Cans of beer per week     Comment: Social/1-2 weekly    Drug use: Never    Sexual activity: Yes     Partners: Male     Birth control/protection: None   Lifestyle    Physical activity:     Days per week: Not on file     Minutes per session: Not on file    Stress: Not on file   Relationships    Social connections:     Talks on phone: Not on file     Gets together: Not on file     Attends Zoroastrian service: Not on file     Active member of club or organization: Not on file     Attends meetings of clubs or organizations: Not on file     Relationship status: Not on file    Intimate partner violence:     Fear of current or ex partner: Not on file     Emotionally abused: Not on file     Physically abused: Not on file     Forced sexual activity: Not on file   Other Topics Concern    Not on file   Social History Narrative    Not on file       Current Outpatient Medications   Medication Sig Dispense Refill    levothyroxine (SYNTHROID) 200 mcg tablet Take 1 Tab by mouth Daily (before breakfast). 90 Tab 1    ezetimibe (ZETIA) 10 mg tablet Take 1 Tab by mouth daily. 90 Tab 1    losartan-hydroCHLOROthiazide (HYZAAR) 100-25 mg per tablet Take 1 Tab by mouth daily.  linaclotide (LINZESS) 145 mcg cap capsule Take 145 mcg by mouth BID Mon Wed & Fri.  Indications: PRN      multivit,iron,minerals/lutein (CENTRUM SILVER ULTRA WOMEN'S PO) Take 1 Tab by mouth daily.  ascorbic acid, vitamin C, (VITAMIN C) 500 mg tablet Take 1,000 mg by mouth as needed.          No Known Allergies    Review of Systems - History obtained from the patient  Constitutional: negative for weight loss, fever, night sweats  HEENT: negative for hearing loss, earache, congestion, snoring, sorethroat  CV: negative for chest pain, palpitations, edema  Resp: negative for cough, shortness of breath, wheezing  GI: negative for change in bowel habits, abdominal pain, black or bloody stools  : negative for frequency, dysuria, hematuria, vaginal discharge, +PMB (single episode), none since prior visit  MSK: negative for back pain, joint pain, muscle pain  Breast: negative for breast lumps, nipple discharge, galactorrhea  Skin :negative for itching, rash, hives  Neuro: negative for dizziness, headache, confusion, weakness  Psych: negative for anxiety, depression, change in mood  Heme/lymph: negative for bleeding, bruising, pallor    Physical Exam  Visit Vitals  /74 (BP 1 Location: Left arm, BP Patient Position: Sitting)   Ht 5' 3\" (1.6 m)   Wt 169 lb (76.7 kg)   BMI 29.94 kg/m²     Constitutional  · Appearance: well-nourished, well developed, alert, in no acute distress    HENT  · Head and Face: appears normal    Chest  · Respiratory Effort: non-labored breathing  · Auscultation: CTAB, normal breath sounds    Cardiovascular  · Heart:  · Auscultation: regular rate and rhythm without murmur  · Extremities: no peripheral edema    Gastrointestinal  · Abdominal Examination: abdomen non-tender to palpation, normal bowel sounds, no masses present  · Liver and spleen: no hepatomegaly present, spleen not palpable  · Hernias: no hernias identified    Genitourinary  · External Genitalia: normal appearance for age, no discharge present, no tenderness present, no inflammatory lesions present, no masses present, no atrophy present  · Vagina: normal vaginal vault without central or paravaginal defects, no discharge present, no inflammatory lesions present, no masses present  · Bladder: non-tender to palpation  · Urethra: appears normal  · Cervix: normal, small cystic structure at 6-7 o'clock position on cervix (?nabothian cyst vs. small cervical fibroid, overall benign appearanace)  · Uterus: enlarged 12-14 wk sized multifibroid uterus  · Adnexa: no adnexal tenderness present, no adnexal masses appreciated  · Perineum: perineum within normal limits, no evidence of trauma, no rashes or skin lesions present    Skin  · General Inspection: no rash, no lesions identified    Neurologic/Psychiatric  · Mental Status:  · Orientation: grossly oriented to person, place and time  · Mood and Affect: mood normal, affect appropriate      Assessment/Plan:  61 y.o. presenting for evaluation of PMB. H/o fibroids. TVUS today confirming presence of fibroids, unable to visualize endometirum. -TVUS findings reviewed with pt today. -EMBx recommended and performed to r/o hyperplasia/malignancy, plan pending pathology  -advised follow up for any additional bleeding episodes    RTC: pending biopsy results    Azul Ashley MD  1/3/2020  5:35 PM         Procedure note: Endometrial biopsy    Zakiya Guidry is a No obstetric history on file. ,  61 y.o. female [address] No LMP recorded. Patient is postmenopausal.  The patient has a history of The encounter diagnosis was PMB (postmenopausal bleeding). and presents for an endometrial biopsy. Indications:   After the indications, risks, benefits, and alternatives to performing an endometrial biopsy were explained to the patient, her questions were answered and informed consent was obtained. Procedure: The patient was placed on the table in the dorsal lithotomy position. A bimanual exam showed the uterus to be anterior. The uterus was slightly enlarged. A speculum was placed in the vagina. The cervix was visualized and prepped with zephrin.  A tenaculum was laced on the anterior lip of the cervix for traction. It was necessary to dilate the cervix which was somewhat stenotic. A pipelle was passed through the endocervical canal. The uterus was sounded to 7 cm's. A moderate amount of tissue was returned. This tissue was placed in formalin and sent to pathology. The patient tolerated the procedure well and she reported mild cramping. The tenaculum and speculum were removed. Post Procedural Status: The patient was observed for 10 minutes after the procedure. She had mild cramping at the time of discharge. There were no complications. The patient was discharged in stable condition.      Kb Collins MD  1/3/2020  5:37 PM

## 2020-01-03 NOTE — PATIENT INSTRUCTIONS
Uterine Fibroids: Care Instructions  Your Care Instructions    Uterine fibroids are growths in the uterus. Fibroids aren't cancer. Doctors don't know what causes fibroids. Fibroids are very common in women during their childbearing years. Fibroids can grow on the inside of the uterus, in the muscle wall of the uterus, or near the outside wall of the uterus. In some women, fibroids cause painful cramps and heavy periods. In these cases, taking anti-inflammatory medicines, birth control pills, or using an intrauterine device (IUD) often helps decrease symptoms. Sometimes surgery is needed to treat fibroids. But if you are near menopause, you may want to wait and see if your symptoms get better. Most fibroids shrink and go away after menopause, when your menstrual periods stop completely. Follow-up care is a key part of your treatment and safety. Be sure to make and go to all appointments, and call your doctor if you are having problems. It's also a good idea to know your test results and keep a list of the medicines you take. How can you care for yourself at home? · If your doctor gave you medicine, take it as exactly as prescribed. Be safe with medicines. Call your doctor if you think you are having a problem with your medicine. · Take anti-inflammatory medicines for pain. These include ibuprofen (Advil, Motrin) and naproxen (Aleve). Read and follow all instructions on the label. · Use heat, such as a hot water bottle or a heating pad set on low, or a warm bath to relax tense muscles and relieve cramping. Put a thin cloth between the heating pad and your skin. Never go to sleep with a heating pad on. · Lie down and put a pillow under your knees. Or, lie on your side and bring your knees up to your chest. These positions may help relieve belly pain or pressure. · Keep track of how many sanitary pads or tampons you use each day. · Get at least 30 minutes of exercise on most days of the week.  Walking is a good choice. You also may want to do other activities, such as running, swimming, cycling, or playing tennis or team sports. · If you bleed longer than usual or have heavy bleeding, take a daily multivitamin with iron. When should you call for help? Call your doctor now or seek immediate medical care if:    · You have severe vaginal bleeding.     · You have new or worse belly or pelvic pain.    Watch closely for changes in your health, and be sure to contact your doctor if:    · You have unusual vaginal bleeding.     · You do not get better as expected. Where can you learn more? Go to http://dillon-melissa.info/. Enter B121 in the search box to learn more about \"Uterine Fibroids: Care Instructions. \"  Current as of: February 19, 2019  Content Version: 12.2  © 1623-4503 [employer]. Care instructions adapted under license by [employer] (which disclaims liability or warranty for this information). If you have questions about a medical condition or this instruction, always ask your healthcare professional. [address] any warranty or liability for your use of this information.

## 2020-01-07 LAB
DX ICD CODE: NORMAL
DX ICD CODE: NORMAL
PATH REPORT.FINAL DX SPEC: NORMAL
PATH REPORT.GROSS SPEC: NORMAL
PATH REPORT.SITE OF ORIGIN SPEC: NORMAL
PATHOLOGIST NAME: NORMAL
PAYMENT PROCEDURE: NORMAL

## 2020-01-07 NOTE — PROGRESS NOTES
Endometrial biopsy benign - we were able to get some endometrial tissue, though scant    Please advise pt to monitor and follow up for any additional bleeding episodes. Otherwise reassured by biopsy and recent ultrasound. Bleeding most likely related to atrophy and fibroids. Please advise follow up appointment with me in 4 months if no additional bleeding. Thanks!

## 2020-04-01 NOTE — TELEPHONE ENCOUNTER
----- Message from Cece Fritz sent at 4/1/2020  3:45 PM EDT -----  Regarding: DR. Klein/Telephone  Contact: [phone]  Caller (if not patient): N/A  Relationship of caller (if not patient): N/A  Best contact number(s): [phone]  Name of medication and dosage if known: Eztimibe tab 10 mg. 90 day supply  Is patient out of this medication (yes/no):  No  Pharmacy name: [facility] listed in chart? (yes/no): Yes  Pharmacy phone number: [phone]  Date of last visit: 11/11/2019  Details to clarify the request: N/A

## 2020-04-02 RX ORDER — EZETIMIBE 10 MG/1
10 TABLET ORAL DAILY
Qty: 90 TAB | Refills: 1 | Status: SHIPPED | OUTPATIENT
Start: 2020-04-02 | End: 2020-05-07 | Stop reason: SDUPTHER

## 2020-04-15 RX ORDER — EZETIMIBE 10 MG/1
10 TABLET ORAL DAILY
Qty: 90 TAB | Refills: 1 | Status: CANCELLED | OUTPATIENT
Start: 2020-04-15

## 2020-05-07 RX ORDER — EZETIMIBE 10 MG/1
10 TABLET ORAL DAILY
Qty: 21 TAB | Refills: 0 | Status: SHIPPED | OUTPATIENT
Start: 2020-05-07 | End: 2020-05-07 | Stop reason: SDUPTHER

## 2020-05-08 RX ORDER — EZETIMIBE 10 MG/1
10 TABLET ORAL DAILY
Qty: 21 TAB | Refills: 0 | Status: SHIPPED | OUTPATIENT
Start: 2020-05-08 | End: 2020-05-22 | Stop reason: SDUPTHER

## 2020-05-22 ENCOUNTER — VIRTUAL VISIT (OUTPATIENT)
Dept: INTERNAL MEDICINE CLINIC | Age: 60
End: 2020-05-22

## 2020-05-22 VITALS — WEIGHT: 165 LBS | BODY MASS INDEX: 29.23 KG/M2 | HEIGHT: 63 IN

## 2020-05-22 DIAGNOSIS — E03.9 ACQUIRED HYPOTHYROIDISM: ICD-10-CM

## 2020-05-22 DIAGNOSIS — K59.09 CHRONIC CONSTIPATION: ICD-10-CM

## 2020-05-22 DIAGNOSIS — I10 ESSENTIAL HYPERTENSION: Primary | ICD-10-CM

## 2020-05-22 DIAGNOSIS — E78.00 PURE HYPERCHOLESTEROLEMIA: ICD-10-CM

## 2020-05-22 RX ORDER — LEVOTHYROXINE SODIUM 200 UG/1
200 TABLET ORAL
Qty: 90 TAB | Refills: 1 | Status: SHIPPED | OUTPATIENT
Start: 2020-05-22 | End: 2020-12-01

## 2020-05-22 RX ORDER — EZETIMIBE 10 MG/1
10 TABLET ORAL DAILY
Qty: 90 TAB | Refills: 1 | Status: SHIPPED | OUTPATIENT
Start: 2020-05-22 | End: 2020-12-01

## 2020-05-22 RX ORDER — LOSARTAN POTASSIUM AND HYDROCHLOROTHIAZIDE 25; 100 MG/1; MG/1
1 TABLET ORAL DAILY
Qty: 90 TAB | Refills: 1 | Status: SHIPPED | OUTPATIENT
Start: 2020-05-22 | End: 2020-12-01

## 2020-05-22 NOTE — PROGRESS NOTES
Health Maintenance Due   Topic Date Due    DTaP/Tdap/Td series (1 - Tdap) 08/10/1981    Shingrix Vaccine Age 50> (1 of 2) 08/10/2010    FOBT Q1Y Age 54-65  08/10/2010       Chief Complaint   Patient presents with    Hypertension     6 months follow up **needs all labs to be sent to [facility] only**    Hypothyroidism    Cholesterol Problem       1. Have you been to the ER, urgent care clinic since your last visit? Hospitalized since your last visit? No    2. Have you seen or consulted any other health care providers outside of the [address] [place] [address] since your last visit? Include any pap smears or colon screening. No    3) Do you have an Advance Directive on file? no    4) Are you interested in receiving information on Advance Directives? NO      Patient is accompanied by self I have received verbal consent from Ravi Suarez to discuss any/all medical information while they are present in the room.

## 2020-05-22 NOTE — PROGRESS NOTES
Jovanni Vital is a 61 y.o. female who was seen by synchronous (real-time) audio-video technology on 5/22/2020. Consent: Jovanni Vital, who was seen by synchronous (real-time) audio-video technology, and/or her healthcare decision maker, is aware that this patient-initiated, Telehealth encounter on 5/22/2020 is a billable service, with coverage as determined by her insurance carrier. She is aware that she may receive a bill and has provided verbal consent to proceed: Yes. Assessment & Plan:   Diagnoses and all orders for this visit:    1. Essential hypertension  -     METABOLIC PANEL, COMPREHENSIVE  -     CBC W/O DIFF    2. Acquired hypothyroidism  -     levothyroxine (SYNTHROID) 200 mcg tablet; Take 1 Tab by mouth Daily (before breakfast). -     T4, FREE  -     TSH 3RD GENERATION    3. Pure hypercholesterolemia  -     METABOLIC PANEL, COMPREHENSIVE  -     LIPID PANEL  -     CBC W/O DIFF    4. Chronic constipation    Other orders  -     ezetimibe (ZETIA) 10 mg tablet; Take 1 Tab by mouth daily. -     linaCLOtide (Linzess) 145 mcg cap capsule; Take 145 mcg by mouth BID Mon Wed & Fri. Indications: PRN  Indications: PRN  -     losartan-hydroCHLOROthiazide (HYZAAR) 100-25 mg per tablet; Take 1 Tab by mouth daily. I spent at least 23 minutes on this visit with this established patient. ([id number])    Subjective:   Jovanni Vital is a 61 y.o. female who was seen for Hypertension (6 months follow up **needs all labs to be sent to [facility] only**); Hypothyroidism; and Cholesterol Problem    Patient has a few chronic medical issues including HTN, HLD, hypothyroidism, chronic constipation. Reports BP being stable at home. Trying to watch her diet and be active physically. Reports having lost some weight. They just moved to a new house. Has been mostly home because COVID-19. Denies any related symptoms including cough, fever, dyspnea, chest pain. She is retired.   Med list and most recent studies reviewed. Reports compliance with medications. Her vision has been getting worse because of cataracts. Followed by ophthalmologist.  She probably will need to have surgery over the next few months. Needs medication refills. Due for labs. No other new complaints. Plan:  Renew all meds for 6 months  Repeat CMP, CBC, TSH, free T4  F/u with other MD's as scheduled  COVID-19 precautions discussed with pt  Follow-up 6 months or as needed      Prior to Admission medications    Medication Sig Start Date End Date Taking? Authorizing Provider   ezetimibe (ZETIA) 10 mg tablet Take 1 Tab by mouth daily. 5/22/20  Yes Giuliano Klein, DO   linaCLOtide (Linzess) 145 mcg cap capsule Take 145 mcg by mouth BID Mon Wed & Fri. Indications: PRN  Indications: PRN 5/22/20  Yes Coy Klein, DO   levothyroxine (SYNTHROID) 200 mcg tablet Take 1 Tab by mouth Daily (before breakfast). 5/22/20  Yes Giuliano Klein, DO   losartan-hydroCHLOROthiazide (HYZAAR) 100-25 mg per tablet Take 1 Tab by mouth daily. 5/22/20  Yes Coy Klein, DO   multivit,iron,minerals/lutein (CENTRUM SILVER ULTRA WOMEN'S PO) Take 1 Tab by mouth daily. Yes Provider, Historical   ezetimibe (ZETIA) 10 mg tablet Take 1 Tab by mouth daily. 5/8/20 5/22/20  Ayla Auguste,    levothyroxine (SYNTHROID) 200 mcg tablet Take 1 Tab by mouth Daily (before breakfast). 12/26/19 5/22/20  Aric Dodge NP   ascorbic acid, vitamin C, (VITAMIN C) 500 mg tablet Take 1,000 mg by mouth as needed. Provider, Historical   losartan-hydroCHLOROthiazide (HYZAAR) 100-25 mg per tablet Take 1 Tab by mouth daily. 5/22/20  Provider, Historical   linaclotide Jeremías Amy) 145 mcg cap capsule Take 145 mcg by mouth BID Mon Wed & Fri.  Indications: PRN  5/22/20  Provider, Historical     No Known Allergies    Patient Active Problem List    Diagnosis Date Noted    Fibroids 11/11/2019    Essential hypertension 05/07/2019    Pure hypercholesterolemia 05/07/2019    Acquired hypothyroidism 05/07/2019    Chronic constipation 05/07/2019    Polyp of colon 05/07/2019     Current Outpatient Medications   Medication Sig Dispense Refill    ezetimibe (ZETIA) 10 mg tablet Take 1 Tab by mouth daily. 90 Tab 1    linaCLOtide (Linzess) 145 mcg cap capsule Take 145 mcg by mouth BID Mon Wed & Fri. Indications: PRN  Indications: PRN 90 Cap 1    levothyroxine (SYNTHROID) 200 mcg tablet Take 1 Tab by mouth Daily (before breakfast). 90 Tab 1    losartan-hydroCHLOROthiazide (HYZAAR) 100-25 mg per tablet Take 1 Tab by mouth daily. 90 Tab 1    multivit,iron,minerals/lutein (CENTRUM SILVER ULTRA WOMEN'S PO) Take 1 Tab by mouth daily.  ascorbic acid, vitamin C, (VITAMIN C) 500 mg tablet Take 1,000 mg by mouth as needed. No Known Allergies  Past Medical History:   Diagnosis Date    Fibroids     Hypercholesterolemia     Hypertension     Hypothyroid     Inguinal hernia bilateral, non-recurrent     Pregnancy     x 3 all vaginal births     Past Surgical History:   Procedure Laterality Date    HX HERNIA REPAIR  12/2018    Bilateral inguinal    HX ORTHOPAEDIC  2007    Left elbow surgery    HX TUBAL LIGATION  1991     Social History     Tobacco Use    Smoking status: Never Smoker    Smokeless tobacco: Never Used   Substance Use Topics    Alcohol use:  Yes     Alcohol/week: 2.0 - 4.0 standard drinks     Types: 1 - 2 Glasses of wine, 1 - 2 Cans of beer per week     Comment: Social/1-2 weekly       ROS    Objective:   Vital Signs: (As obtained by patient/caregiver at home)  Visit Vitals  Height 5' 3\" (1.6 m)   Weight 165 lb (74.8 kg)   Body Mass Index 29.23 kg/m²        [INSTRUCTIONS:  \"[x]\" Indicates a positive item  \"[]\" Indicates a negative item  -- DELETE ALL ITEMS NOT EXAMINED]    Constitutional: [x] Appears well-developed and well-nourished [x] No apparent distress      [] Abnormal -     Mental status: [x] Alert and awake  [x] Oriented to person/place/time [x] Able to follow commands [] Abnormal -     Eyes:   EOM    [x]  Normal    [] Abnormal -   Sclera  [x]  Normal    [] Abnormal -          Discharge [x]  None visible   [] Abnormal -     HENT: [x] Normocephalic, atraumatic  [] Abnormal -   [x] Mouth/Throat: Mucous membranes are moist    External Ears [x] Normal  [] Abnormal -    Neck: [x] No visualized mass [] Abnormal -     Pulmonary/Chest: [x] Respiratory effort normal   [x] No visualized signs of difficulty breathing or respiratory distress        [] Abnormal -      Musculoskeletal:   [x] Normal gait with no signs of ataxia         [x] Normal range of motion of neck        [] Abnormal -     Neurological:        [x] No Facial Asymmetry (Cranial nerve 7 motor function) (limited exam due to video visit)          [x] No gaze palsy        [] Abnormal -          Skin:        [x] No significant exanthematous lesions or discoloration noted on facial skin         [] Abnormal -            Psychiatric:       [x] Normal Affect [] Abnormal -        [x] No Hallucinations    Other pertinent observable physical exam findings:-        We discussed the expected course, resolution and complications of the diagnosis(es) in detail. Medication risks, benefits, costs, interactions, and alternatives were discussed as indicated. I advised her to contact the office if her condition worsens, changes or fails to improve as anticipated. She expressed understanding with the diagnosis(es) and plan. Mahogany Bhagat is a 61 y.o. female who was evaluated by a video visit encounter for concerns as above. Patient identification was verified prior to start of the visit. A caregiver was present when appropriate. Due to this being a TeleHealth encounter (During ZLBFR-00 public health emergency), evaluation of the following organ systems was limited: Vitals/Constitutional/EENT/Resp/CV/GI//MS/Neuro/Skin/Heme-Lymph-Imm.   Pursuant to the emergency declaration under the [facility], [address] [facility] waiver authority and the [employer] Act, this Virtual  Visit was conducted, with patient's (and/or legal guardian's) consent, to reduce the patient's risk of exposure to COVID-19 and provide necessary medical care. Services were provided through a video synchronous discussion virtually to substitute for in-person clinic visit. Patient and provider were located at their individual homes.       Kaylyn Ramirez DO

## 2020-05-26 ENCOUNTER — DOCUMENTATION ONLY (OUTPATIENT)
Dept: INTERNAL MEDICINE CLINIC | Age: 60
End: 2020-05-26

## 2020-11-02 ENCOUNTER — TELEPHONE (OUTPATIENT)
Dept: INTERNAL MEDICINE CLINIC | Age: 60
End: 2020-11-02

## 2020-11-02 NOTE — TELEPHONE ENCOUNTER
Patient had labs drawn over a month ago at [facility] and has not received the results. We do not have the results yet. Please request the results and call patient back when we have the results.

## 2020-11-04 NOTE — TELEPHONE ENCOUNTER
Call placed to [facility] and requested last labs, per [facility] last labs 6/18/2020, call ,placed to patient and she agreed that was the last labs, requested [facility] fax labs to this office

## 2020-11-12 ENCOUNTER — TRANSCRIBE ORDER (OUTPATIENT)
Dept: SCHEDULING | Age: 60
End: 2020-11-12

## 2020-11-12 DIAGNOSIS — Z12.31 VISIT FOR SCREENING MAMMOGRAM: Primary | ICD-10-CM

## 2020-11-19 ENCOUNTER — HOSPITAL ENCOUNTER (OUTPATIENT)
Dept: MAMMOGRAPHY | Age: 60
Discharge: HOME | End: 2020-11-19
Attending: INTERNAL MEDICINE
Payer: COMMERCIAL

## 2020-11-19 DIAGNOSIS — Z12.31 VISIT FOR SCREENING MAMMOGRAM: ICD-10-CM

## 2020-11-19 PROCEDURE — 77067 SCR MAMMO BI INCL CAD: CPT

## 2020-12-18 ENCOUNTER — OFFICE VISIT (OUTPATIENT)
Dept: OBGYN CLINIC | Age: 60
End: 2020-12-18
Payer: COMMERCIAL

## 2020-12-18 VITALS — WEIGHT: 168 LBS | DIASTOLIC BLOOD PRESSURE: 84 MMHG | BODY MASS INDEX: 29.76 KG/M2 | SYSTOLIC BLOOD PRESSURE: 136 MMHG

## 2020-12-18 DIAGNOSIS — D21.9 FIBROIDS: ICD-10-CM

## 2020-12-18 DIAGNOSIS — N89.8 VAGINAL ODOR: Primary | ICD-10-CM

## 2020-12-18 DIAGNOSIS — K59.09 CHRONIC CONSTIPATION: ICD-10-CM

## 2020-12-18 DIAGNOSIS — N89.8 VAGINAL DRYNESS: ICD-10-CM

## 2020-12-18 DIAGNOSIS — Z01.411 ENCOUNTER FOR GYNECOLOGICAL EXAMINATION WITH ABNORMAL FINDING: ICD-10-CM

## 2020-12-18 PROCEDURE — 99396 PREV VISIT EST AGE 40-64: CPT | Performed by: OBSTETRICS & GYNECOLOGY

## 2020-12-18 NOTE — PROGRESS NOTES
Annual exam    Mary Bansal is a 61 y.o. postmenopausal female  A0 presenting for annual exam.     Main concern today is vaginal dryness and occasional vaginal odor. Pt with h/o fibroids. No further PMB since her visit last year, denies bleeding in over a year. Pt notes intermittent pelvic bloating, pressure and constipation from fibroids, but does not desire intervention at this time. H/o bilateral inguinal hernia repairs in ~2018.      Moved to [facility] from [place] to live closer to her 2 daughters.      TRANSVAGINAL ULTRASOUND PERFORMED 1/3/20. THE UTERUS IS ANTEVERTED,ENALRGED IN SIZE AND HETEROGENEOUS IN ECHOGENICITY. THERE  APPEARS TO BE MULTIPLE FIBROIDS SEEN. LARGEST ARE NOTED. RT LATERAL INTRAMURAL 44 X 44MM. LT LATERAL FUNDAL 33 X 30MM. INTRAMURAL VS. SUBMUCOSAL VS. INTRACAVITARY 25 X 26MM. LT  FUNDAL CALCIFIED 26 X 16MM. ML INTRAMURAL 27 X 27MM. UNABLE TO DETERMINE ENDOMETRIUM DUE TO MULTIPLE FIBROIDS AND FIBROID SHADOWING. BILATERAL OVARIES ARE NOT VISUALIZED DUE TO BOWEL GAS. RT AND LT ADNEXAS APPEAR WNL. NO FREE FLUID SEEN IN THE CDS.     Ob/Gyn Hx:   A0 - 3   Stanley-57  VMS-denies  Vag dryness-present  STI- denies  ? SA-yes     Health maintenance:  Pap-12/10/19 NILM HPV Neg  Mammo-20 B1  Colonoscopy- , h/o polyps    Past Medical History:   Diagnosis Date    Fibroids     Hypercholesterolemia     Hypertension     Hypothyroid     Inguinal hernia bilateral, non-recurrent     Menopause     Pregnancy     x 3 all vaginal births       Past Surgical History:   Procedure Laterality Date    HX HERNIA REPAIR  2018    Bilateral inguinal    HX ORTHOPAEDIC      Left elbow surgery    HX TUBAL LIGATION         Family History   Problem Relation Age of Onset    Diabetes Mother     Heart Disease Mother     Heart Disease Father     Lung Cancer Maternal Grandmother        Social History     Socioeconomic History    Marital status:      Spouse name: Not on file    Number of children: Not on file    Years of education: Not on file    Highest education level: Not on file   Occupational History    Not on file   Social Needs    Financial resource strain: Not on file    Food insecurity     Worry: Not on file     Inability: Not on file    Transportation needs     Medical: Not on file     Non-medical: Not on file   Tobacco Use    Smoking status: Never Smoker    Smokeless tobacco: Never Used   Substance and Sexual Activity    Alcohol use: Yes     Alcohol/week: 2.0 - 4.0 standard drinks     Types: 1 - 2 Glasses of wine, 1 - 2 Cans of beer per week     Comment: Social/1-2 weekly    Drug use: Never    Sexual activity: Yes     Partners: Male     Birth control/protection: None   Lifestyle    Physical activity     Days per week: Not on file     Minutes per session: Not on file    Stress: Not on file   Relationships    Social connections     Talks on phone: Not on file     Gets together: Not on file     Attends Alevism service: Not on file     Active member of club or organization: Not on file     Attends meetings of clubs or organizations: Not on file     Relationship status: Not on file    Intimate partner violence     Fear of current or ex partner: Not on file     Emotionally abused: Not on file     Physically abused: Not on file     Forced sexual activity: Not on file   Other Topics Concern    Not on file   Social History Narrative    Not on file       Current Outpatient Medications   Medication Sig Dispense Refill    levothyroxine (SYNTHROID) 200 mcg tablet TAKE 1 TABLET DAILY BEFORE BREAKFAST 90 Tab 0    losartan-hydroCHLOROthiazide (HYZAAR) 100-25 mg per tablet TAKE 1 TABLET DAILY 90 Tab 0    ezetimibe (ZETIA) 10 mg tablet TAKE 1 TABLET DAILY 90 Tab 0    multivit,iron,minerals/lutein (CENTRUM SILVER ULTRA WOMEN'S PO) Take 1 Tab by mouth daily.  ascorbic acid, vitamin C, (VITAMIN C) 500 mg tablet Take 1,000 mg by mouth as needed.       linaCLOtide (Linzess) 145 mcg cap capsule Take 145 mcg by mouth BID Mon Wed & Fri.  Indications: PRN  Indications: PRN 90 Cap 1       No Known Allergies    Review of Systems - History obtained from the patient  Constitutional: negative for weight loss, fever, night sweats  HEENT: negative for hearing loss, earache, congestion, snoring, sorethroat  CV: negative for chest pain, palpitations, edema  Resp: negative for cough, shortness of breath, wheezing  GI: negative for change in bowel habits, abdominal pain, black or bloody stools, +constipation, bloating  : negative for frequency, dysuria, hematuria, vaginal discharge, +pelvic pressure, vaginal dryness, vaginal odor  MSK: negative for back pain, joint pain, muscle pain  Breast: negative for breast lumps, nipple discharge, galactorrhea  Skin :negative for itching, rash, hives  Neuro: negative for dizziness, headache, confusion, weakness  Psych: negative for anxiety, depression, change in mood  Heme/lymph: negative for bleeding, bruising, pallor    Physical Exam  Visit Vitals  /84   Wt 168 lb (76.2 kg)   BMI 29.76 kg/m²     Constitutional  · Appearance: well-nourished, well developed, alert, in no acute distress    HENT  · Head and Face: appears normal    Neck  · Inspection/Palpation: normal appearance, no masses or tenderness  · Lymph Nodes: no lymphadenopathy present  · Thyroid: gland size normal, nontender, no nodules or masses present on palpation    Chest  · Respiratory Effort: non-labored breathing  · Auscultation: CTAB, normal breath sounds    Cardiovascular  · Heart:  · Auscultation: regular rate and rhythm without murmur  · Extremities: no peripheral edema    Breasts  · Inspection of Breasts: breasts symmetrical, no skin changes, no discharge present, nipple appearance normal, no skin retraction present  · Palpation of Breasts and Axillae: no masses present on palpation, no breast tenderness  · Axillary Lymph Nodes: no lymphadenopathy present    Gastrointestinal  · Abdominal Examination: abdomen non-tender to palpation, normal bowel sounds, no masses present  · Liver and spleen: no hepatomegaly present, spleen not palpable  · Hernias: no hernias identified    Genitourinary  · External Genitalia: normal appearance for age, no discharge present, no tenderness present, no inflammatory lesions present, no masses present, +atrophy of UG mucosa  · Vagina: normal vaginal vault without central or paravaginal defects, no discharge present, no inflammatory lesions present, no masses present  · Bladder: non-tender to palpation  · Urethra: appears normal  · Cervix: normal, small ~0.5cm nabothian cyst vs cervical fibroid at 6 o'clock position on cervix, stable/unchanged, overall benign appearance  · Uterus: enlarged 12-14 week sized fibroid uterus, non-tender, stable size  · Adnexa: no adnexal tenderness present, no adnexal masses present  · Perineum: perineum within normal limits, no evidence of trauma, no rashes or skin lesions present    Skin  · General Inspection: no rash, no lesions identified    Neurologic/Psychiatric  · Mental Status:  · Orientation: grossly oriented to person, place and time  · Mood and Affect: mood normal, affect appropriate      Assessment/Plan:  61 y.o. postmenopausal female presenting for annual exam. Overall doing well. +vaignal dryness and odor. +pelvic pressure/bloating/constipation associated with 12-14 wk sized fibroid uterus. PMB resolved, no bleeding in >1 year.     Health Maintenance:  -diet, exercise, healthy lifestyle  -pap/HPV utd  -declines STI testing  -nuswab vaginitis to r/o BV given vaginal odor symptoms  -info provided on vaginal moisturizers/lubricants  -mammo utd, repeat 1 year  -colonoscopy utd  -refer for TVUS to evaluate pelvic pressure/bloating and make sure fibroids unchanged  -reviewed options for management of fibroids, pt desiring expectant management at this time.  -bowel regimen encouraged    RTC: 2 wks for US, 1 year for AE, or sooner prn    Keshav Palomares MD  12/18/2020  10:42 AM

## 2020-12-22 LAB
A VAGINAE DNA VAG QL NAA+PROBE: NORMAL SCORE
BVAB2 DNA VAG QL NAA+PROBE: NORMAL SCORE
C ALBICANS DNA VAG QL NAA+PROBE: NEGATIVE
C GLABRATA DNA VAG QL NAA+PROBE: NEGATIVE
MEGA1 DNA VAG QL NAA+PROBE: NORMAL SCORE
SPECIMEN STATUS REPORT, ROLRST: NORMAL

## 2020-12-29 ENCOUNTER — OFFICE VISIT (OUTPATIENT)
Dept: INTERNAL MEDICINE CLINIC | Age: 60
End: 2020-12-29
Payer: COMMERCIAL

## 2020-12-29 VITALS
SYSTOLIC BLOOD PRESSURE: 130 MMHG | BODY MASS INDEX: 29.59 KG/M2 | TEMPERATURE: 98.4 F | RESPIRATION RATE: 14 BRPM | DIASTOLIC BLOOD PRESSURE: 82 MMHG | HEIGHT: 63 IN | WEIGHT: 167 LBS | OXYGEN SATURATION: 99 % | HEART RATE: 67 BPM

## 2020-12-29 DIAGNOSIS — K21.9 GASTROESOPHAGEAL REFLUX DISEASE WITHOUT ESOPHAGITIS: ICD-10-CM

## 2020-12-29 DIAGNOSIS — E78.00 PURE HYPERCHOLESTEROLEMIA: ICD-10-CM

## 2020-12-29 DIAGNOSIS — K63.5 POLYP OF COLON, UNSPECIFIED PART OF COLON, UNSPECIFIED TYPE: ICD-10-CM

## 2020-12-29 DIAGNOSIS — I10 ESSENTIAL HYPERTENSION: ICD-10-CM

## 2020-12-29 DIAGNOSIS — K59.09 CHRONIC CONSTIPATION: ICD-10-CM

## 2020-12-29 DIAGNOSIS — Z23 ENCOUNTER FOR IMMUNIZATION: ICD-10-CM

## 2020-12-29 DIAGNOSIS — E03.9 ACQUIRED HYPOTHYROIDISM: ICD-10-CM

## 2020-12-29 DIAGNOSIS — Z00.00 WELL ADULT EXAM: Primary | ICD-10-CM

## 2020-12-29 PROCEDURE — 99396 PREV VISIT EST AGE 40-64: CPT | Performed by: INTERNAL MEDICINE

## 2020-12-29 PROCEDURE — 90686 IIV4 VACC NO PRSV 0.5 ML IM: CPT | Performed by: INTERNAL MEDICINE

## 2020-12-30 LAB
ALBUMIN SERPL-MCNC: 4.6 G/DL (ref 3.8–4.9)
ALBUMIN/GLOB SERPL: 1.4 {RATIO} (ref 1.2–2.2)
ALP SERPL-CCNC: 66 IU/L (ref 39–117)
ALT SERPL-CCNC: 18 IU/L (ref 0–32)
AST SERPL-CCNC: 22 IU/L (ref 0–40)
BILIRUB SERPL-MCNC: 0.3 MG/DL (ref 0–1.2)
BUN SERPL-MCNC: 18 MG/DL (ref 8–27)
BUN/CREAT SERPL: 18 (ref 12–28)
CALCIUM SERPL-MCNC: 9.7 MG/DL (ref 8.7–10.3)
CHLORIDE SERPL-SCNC: 100 MMOL/L (ref 96–106)
CHOLEST SERPL-MCNC: 243 MG/DL (ref 100–199)
CO2 SERPL-SCNC: 27 MMOL/L (ref 20–29)
CREAT SERPL-MCNC: 0.99 MG/DL (ref 0.57–1)
ERYTHROCYTE [DISTWIDTH] IN BLOOD BY AUTOMATED COUNT: 13.3 % (ref 11.7–15.4)
GLOBULIN SER CALC-MCNC: 3.2 G/DL (ref 1.5–4.5)
GLUCOSE SERPL-MCNC: 83 MG/DL (ref 65–99)
HCT VFR BLD AUTO: 41.2 % (ref 34–46.6)
HDLC SERPL-MCNC: 79 MG/DL
HGB BLD-MCNC: 13 G/DL (ref 11.1–15.9)
INTERPRETATION, 910389: NORMAL
LDLC SERPL CALC-MCNC: 154 MG/DL (ref 0–99)
MCH RBC QN AUTO: 26.5 PG (ref 26.6–33)
MCHC RBC AUTO-ENTMCNC: 31.6 G/DL (ref 31.5–35.7)
MCV RBC AUTO: 84 FL (ref 79–97)
PLATELET # BLD AUTO: 245 X10E3/UL (ref 150–450)
POTASSIUM SERPL-SCNC: 3.8 MMOL/L (ref 3.5–5.2)
PROT SERPL-MCNC: 7.8 G/DL (ref 6–8.5)
RBC # BLD AUTO: 4.91 X10E6/UL (ref 3.77–5.28)
SODIUM SERPL-SCNC: 141 MMOL/L (ref 134–144)
T4 FREE SERPL-MCNC: 1.59 NG/DL (ref 0.82–1.77)
TRIGL SERPL-MCNC: 63 MG/DL (ref 0–149)
TSH SERPL DL<=0.005 MIU/L-ACNC: 1.95 UIU/ML (ref 0.45–4.5)
VLDLC SERPL CALC-MCNC: 10 MG/DL (ref 5–40)
WBC # BLD AUTO: 7 X10E3/UL (ref 3.4–10.8)

## 2020-12-31 NOTE — PROGRESS NOTES
HISTORY OF PRESENT ILLNESS  Yara Camacho is a 61 y.o. female. She is here for a physical.  Vital signs are stable. Reports compliance with medications as documented. Active physically and watching diet. She is overweight but weight stable. Reports issues with GERD. Has chronic constipation. Chuy [place] is helping. History of colon polyps. She is due for colonoscopy. Needs medication refills. Lives with her . She is retired. No other new complaints. Review of Systems   Constitutional: Negative. HENT: Negative. Eyes: Negative. Respiratory: Negative. Cardiovascular: Negative. Gastrointestinal: Positive for constipation. Negative for abdominal pain. Genitourinary: Negative. Musculoskeletal: Positive for joint pain. Skin: Negative. Neurological: Negative. Endo/Heme/Allergies: Negative. Psychiatric/Behavioral: Negative. Physical Exam  Vitals signs and nursing note reviewed. Constitutional:       General: She is not in acute distress. Appearance: She is well-developed. Comments: Pleasant overweight lady   HENT:      Head: Normocephalic and atraumatic. Right Ear: Tympanic membrane normal.      Left Ear: Tympanic membrane normal.      Nose: Nose normal.      Mouth/Throat:      Mouth: Mucous membranes are moist.      Pharynx: Oropharynx is clear. Eyes:      Conjunctiva/sclera: Conjunctivae normal.      Pupils: Pupils are equal, round, and reactive to light. Comments: Small cataracts   Neck:      Musculoskeletal: Normal range of motion and neck supple. Thyroid: No thyromegaly. Vascular: No JVD. Cardiovascular:      Rate and Rhythm: Normal rate and regular rhythm. Heart sounds: Normal heart sounds. No murmur. Pulmonary:      Effort: Pulmonary effort is normal. No respiratory distress. Breath sounds: Normal breath sounds. No wheezing or rales. Abdominal:      General: Bowel sounds are normal. There is no distension. Palpations: Abdomen is soft. Tenderness: There is no abdominal tenderness. Musculoskeletal:         General: No tenderness, deformity or signs of injury. Right lower leg: No edema. Left lower leg: No edema. Skin:     General: Skin is warm and dry. Findings: No rash. Neurological:      Mental Status: She is alert and oriented to person, place, and time. Coordination: Coordination normal.   Psychiatric:         Behavior: Behavior normal.         ASSESSMENT and PLAN  Diagnoses and all orders for this visit:    1. Well adult exam  -     LIPID PANEL; Future  -     METABOLIC PANEL, COMPREHENSIVE; Future  -     CBC W/O DIFF; Future  -     TSH 3RD GENERATION; Future    2. Acquired hypothyroidism    3. Essential hypertension    4. Pure hypercholesterolemia    5. Chronic constipation    6. Polyp of colon, unspecified part of colon, unspecified type  -     REFERRAL TO GASTROENTEROLOGY    7. Gastroesophageal reflux disease without esophagitis  -     REFERRAL TO GASTROENTEROLOGY    8. Encounter for immunization  -     INFLUENZA VIRUS VAC QUAD,SPLIT,PRESV FREE SYRINGE IM      Follow-up and Dispositions    · Return in about 6 months (around 6/29/2021).      lab results and schedule of future lab studies reviewed with patient  reviewed diet, exercise and weight control  reviewed medications and side effects in detail  F/u with other MD's as scheduled  COVID-19 precautions discussed with pt  Overall stable

## 2021-01-06 NOTE — PROGRESS NOTES
Cholesterol levels elevated. Recommend that patient watch diet for fatty foods and exercise as tolerated. Otherwise, stable labs.

## 2021-02-22 ENCOUNTER — OFFICE VISIT (OUTPATIENT)
Dept: OBGYN CLINIC | Age: 61
End: 2021-02-22
Payer: COMMERCIAL

## 2021-02-22 VITALS
WEIGHT: 170 LBS | HEIGHT: 63 IN | BODY MASS INDEX: 30.12 KG/M2 | SYSTOLIC BLOOD PRESSURE: 128 MMHG | DIASTOLIC BLOOD PRESSURE: 78 MMHG

## 2021-02-22 DIAGNOSIS — R10.2 PELVIC PAIN IN FEMALE: ICD-10-CM

## 2021-02-22 DIAGNOSIS — D21.9 FIBROIDS: Primary | ICD-10-CM

## 2021-02-22 PROCEDURE — 99213 OFFICE O/P EST LOW 20 MIN: CPT | Performed by: OBSTETRICS & GYNECOLOGY

## 2021-02-22 NOTE — PROGRESS NOTES
Follow Up    Melissa Xiong is a 61 y.o. postmenopausal female  A0 presenting for ultrasound and follow up of fibroids and pelvic bloating. US today showing fibroids largely unchanged from prior scan 1 year ago. No change in symptoms. No PMB. Denies bleeding in over a year. Pt notes intermittent pelvic bloating, pressure and constipation from fibroids, but does not desire intervention at this time. H/o bilateral inguinal hernia repairs in ~2018.      Moved to [facility] from [place] to live closer to her 2 daughters. TV ULTRASOUND 21  THE UTERUS IS ANTEVERTED, NORMAL IN SIZE AND HETEROGENOUS IN ECHOGENICITY. THERE APPEARS TO  BE MULTIPLE FIBROIDS SEEN THOUGHOUT THE UTERUS, ML SUBMUCOSAL VS. INTRACAVITARY MEASURING 33  X 24 MM. RT LAT INTRAMURAL MEASURING 45 X 40 MM. POSTERIOR CALCIFIED SUBSEROSAL MEASURING 31 X  25 MM. ML SUBMUCOSAL VS. INTRACAVITARY MEASURING 11 X 9 MM. THE ENDOMETRIUM MEASURES 26MM IN THICKNESS (but suspected intracavitary fibroid, and no PMB). NO MASSES OR ABNORMALITIES ARE SEEN. RIGHT OVARY APPEARS WNL. LEFT OVARY NOT SEEN DUE TO BOWEL GAS. LT ADN APPEARS WNL. NO FREE FLUID IS SEEN IN THE CDS.     TRANSVAGINAL ULTRASOUND PERFORMED 1/3/20. THE UTERUS IS ANTEVERTED,ENALRGED IN SIZE AND HETEROGENEOUS IN ECHOGENICITY. THERE  APPEARS TO BE MULTIPLE FIBROIDS SEEN. LARGEST ARE NOTED. RT LATERAL INTRAMURAL 44 X 44MM. LT LATERAL FUNDAL 33 X 30MM. INTRAMURAL VS. SUBMUCOSAL VS. INTRACAVITARY 25 X 26MM. LT  FUNDAL CALCIFIED 26 X 16MM. ML INTRAMURAL 27 X 27MM. UNABLE TO DETERMINE ENDOMETRIUM DUE TO MULTIPLE FIBROIDS AND FIBROID SHADOWING. BILATERAL OVARIES ARE NOT VISUALIZED DUE TO BOWEL GAS. RT AND LT ADNEXAS APPEAR WNL. NO FREE FLUID SEEN IN THE CDS.     Ob/Gyn Hx:   A0 - 3   Hermon-57  VMS-denies  Vag dryness-present  STI- denies  ? SA-yes     Health maintenance:  Pap-12/10/19 NILM HPV Neg  Mammo-20 B1  Colonoscopy- , h/o polyps    Past Medical History:   Diagnosis Date    Fibroids     Hypercholesterolemia     Hypertension     Hypothyroid     Inguinal hernia bilateral, non-recurrent     Menopause     Pregnancy     x 3 all vaginal births       Past Surgical History:   Procedure Laterality Date    HX HERNIA REPAIR  12/2018    Bilateral inguinal    HX ORTHOPAEDIC  2007    Left elbow surgery    HX TUBAL LIGATION  1991       Family History   Problem Relation Age of Onset    Diabetes Mother     Heart Disease Mother     Heart Disease Father     Lung Cancer Maternal Grandmother        Social History     Socioeconomic History    Marital status:      Spouse name: Not on file    Number of children: Not on file    Years of education: Not on file    Highest education level: Not on file   Occupational History    Not on file   Social Needs    Financial resource strain: Not on file    Food insecurity     Worry: Not on file     Inability: Not on file    Transportation needs     Medical: Not on file     Non-medical: Not on file   Tobacco Use    Smoking status: Never Smoker    Smokeless tobacco: Never Used   Substance and Sexual Activity    Alcohol use:  Yes     Alcohol/week: 2.0 - 4.0 standard drinks     Types: 1 - 2 Glasses of wine, 1 - 2 Cans of beer per week     Comment: Social/1-2 weekly    Drug use: Never    Sexual activity: Yes     Partners: Male     Birth control/protection: None   Lifestyle    Physical activity     Days per week: Not on file     Minutes per session: Not on file    Stress: Not on file   Relationships    Social connections     Talks on phone: Not on file     Gets together: Not on file     Attends Confucianism service: Not on file     Active member of club or organization: Not on file     Attends meetings of clubs or organizations: Not on file     Relationship status: Not on file    Intimate partner violence     Fear of current or ex partner: Not on file     Emotionally abused: Not on file     Physically abused: Not on file     Forced sexual activity: Not on file   Other Topics Concern    Not on file   Social History Narrative    Not on file       Current Outpatient Medications   Medication Sig Dispense Refill    levothyroxine (SYNTHROID) 200 mcg tablet TAKE 1 TABLET DAILY BEFORE BREAKFAST 90 Tab 0    losartan-hydroCHLOROthiazide (HYZAAR) 100-25 mg per tablet TAKE 1 TABLET DAILY 90 Tab 0    ezetimibe (ZETIA) 10 mg tablet TAKE 1 TABLET DAILY 90 Tab 0    linaCLOtide (Linzess) 145 mcg cap capsule Take 145 mcg by mouth BID Mon Wed & Fri. Indications: PRN  Indications: PRN 90 Cap 1    multivit,iron,minerals/lutein (CENTRUM SILVER ULTRA WOMEN'S PO) Take 1 Tab by mouth daily.  ascorbic acid, vitamin C, (VITAMIN C) 500 mg tablet Take 1,000 mg by mouth as needed.          No Known Allergies    Review of Systems - History obtained from the patient  Constitutional: negative for weight loss, fever, night sweats  HEENT: negative for hearing loss, earache, congestion, snoring, sorethroat  CV: negative for chest pain, palpitations, edema  Resp: negative for cough, shortness of breath, wheezing  GI: negative for change in bowel habits, abdominal pain, black or bloody stools, +constipation, bloating  : negative for frequency, dysuria, hematuria, vaginal discharge, +pelvic pressure, vaginal dryness, vaginal odor  MSK: negative for back pain, joint pain, muscle pain  Breast: negative for breast lumps, nipple discharge, galactorrhea  Skin :negative for itching, rash, hives  Neuro: negative for dizziness, headache, confusion, weakness  Psych: negative for anxiety, depression, change in mood  Heme/lymph: negative for bleeding, bruising, pallor    Physical Exam  Visit Vitals  /78   Ht 5' 3\" (1.6 m)   Wt 170 lb (77.1 kg)   BMI 30.11 kg/m²     Constitutional  · Appearance: well-nourished, well developed, alert, in no acute distress    HENT  · Head and Face: appears normal    Chest  · Respiratory Effort: non-labored breathing    Cardiovascular  · Extremities: no peripheral edema    Skin  · General Inspection: no rash, no lesions identified    Neurologic/Psychiatric  · Mental Status:  · Orientation: grossly oriented to person, place and time  · Mood and Affect: mood normal, affect appropriate      Assessment/Plan:  61 y.o. postmenopausal female presenting for follow up of pelvic pressure/bloating/constipation associated with 12-14 wk sized fibroid uterus. PMB resolved, no bleeding in >1 year.     -TVUS findings reviews with pt today, unchanged, reassurance provided  -reviewed options for management of fibroids, pt desiring expectant management at this time  -bowel regimen encouraged    RTC: for AE or sooner prn    Aly Ibrahim MD  2/22/2021  2:06 PM

## 2021-02-22 NOTE — PATIENT INSTRUCTIONS
Uterine Fibroids: Care Instructions  Your Care Instructions     Uterine fibroids are growths in the uterus. Fibroids aren't cancer. Doctors don't know what causes fibroids. Fibroids are very common in women during their childbearing years. Fibroids can grow on the inside of the uterus, in the muscle wall of the uterus, or near the outside wall of the uterus. In some women, fibroids cause painful cramps and heavy periods. In these cases, taking anti-inflammatory medicines, birth control pills, or using an intrauterine device (IUD) often helps decrease symptoms. Sometimes surgery is needed to treat fibroids. But if you are near menopause, you may want to wait and see if your symptoms get better. Most fibroids shrink and go away after menopause, when your menstrual periods stop completely. Follow-up care is a key part of your treatment and safety. Be sure to make and go to all appointments, and call your doctor if you are having problems. It's also a good idea to know your test results and keep a list of the medicines you take. How can you care for yourself at home? · If your doctor gave you medicine, take it as exactly as prescribed. Be safe with medicines. Call your doctor if you think you are having a problem with your medicine. · Take anti-inflammatory medicines for pain. These include ibuprofen (Advil, Motrin) and naproxen (Aleve). Read and follow all instructions on the label. · Use heat, such as a hot water bottle or a heating pad set on low, or a warm bath to relax tense muscles and relieve cramping. Put a thin cloth between the heating pad and your skin. Never go to sleep with a heating pad on. · Lie down and put a pillow under your knees. Or, lie on your side and bring your knees up to your chest. These positions may help relieve belly pain or pressure. · Keep track of how many sanitary pads or tampons you use each day. · Get at least 30 minutes of exercise on most days of the week.  Walking is a good choice. You also may want to do other activities, such as running, swimming, cycling, or playing tennis or team sports. · If you bleed longer than usual or have heavy bleeding, take a daily multivitamin with iron. When should you call for help? Call your doctor now or seek immediate medical care if:    · You have severe vaginal bleeding.     · You have new or worse belly or pelvic pain. Watch closely for changes in your health, and be sure to contact your doctor if:    · You have unusual vaginal bleeding.     · You do not get better as expected. Where can you learn more? Go to http://www.gray.com/  Enter B121 in the search box to learn more about \"Uterine Fibroids: Care Instructions. \"  Current as of: November 8, 2019               Content Version: 12.6  © 1457-4999 [employer]. Care instructions adapted under license by [employer] (which disclaims liability or warranty for this information). If you have questions about a medical condition or this instruction, always ask your healthcare professional. [address] any warranty or liability for your use of this information.

## 2021-06-25 ENCOUNTER — OFFICE VISIT (OUTPATIENT)
Dept: INTERNAL MEDICINE CLINIC | Age: 61
End: 2021-06-25
Payer: COMMERCIAL

## 2021-06-25 VITALS
DIASTOLIC BLOOD PRESSURE: 82 MMHG | BODY MASS INDEX: 30.12 KG/M2 | HEIGHT: 63 IN | HEART RATE: 71 BPM | SYSTOLIC BLOOD PRESSURE: 132 MMHG | RESPIRATION RATE: 16 BRPM | TEMPERATURE: 98.4 F | OXYGEN SATURATION: 99 % | WEIGHT: 170 LBS

## 2021-06-25 DIAGNOSIS — G47.00 INSOMNIA, UNSPECIFIED TYPE: ICD-10-CM

## 2021-06-25 DIAGNOSIS — E78.00 PURE HYPERCHOLESTEROLEMIA: ICD-10-CM

## 2021-06-25 DIAGNOSIS — F41.9 ANXIETY: ICD-10-CM

## 2021-06-25 DIAGNOSIS — I10 ESSENTIAL HYPERTENSION: ICD-10-CM

## 2021-06-25 DIAGNOSIS — E03.9 ACQUIRED HYPOTHYROIDISM: Primary | ICD-10-CM

## 2021-06-25 DIAGNOSIS — K59.09 CHRONIC CONSTIPATION: ICD-10-CM

## 2021-06-25 PROCEDURE — 99214 OFFICE O/P EST MOD 30 MIN: CPT | Performed by: INTERNAL MEDICINE

## 2021-06-25 NOTE — PROGRESS NOTES
Health Maintenance Due   Topic Date Due    DTaP/Tdap/Td series (1 - Tdap) Never done    Shingrix Vaccine Age 50> (1 of 2) Never done       Chief Complaint   Patient presents with    Anxiety    Abdominal Pain    Other     6m f/u       1. Have you been to the ER, urgent care clinic since your last visit? Hospitalized since your last visit? No    2. Have you seen or consulted any other health care providers outside of the [address] [place] [address] since your last visit? Include any pap smears or colon screening. No    3) Do you have an Advance Directive on file? no    4) Are you interested in receiving information on Advance Directives? NO      Patient is accompanied by self I have received verbal consent from Nicolle Gage to discuss any/all medical information while they are present in the room.

## 2021-06-30 NOTE — PROGRESS NOTES
HISTORY OF PRESENT ILLNESS  Mckenzie Encarnacion is a 61 y.o. female. Pt. comes in for f/u. Has a few chronic medical issues as documented including hypothyroidism, HTN, HLD, constipation, insomnia. Vital signs are stable. She is obese with BMI of 30. Weight is unchanged since last visit. Reports having issues with anxiety. According to her  she worries about everything. Has difficulty sleeping. Reports some OAB symptoms with nocturia. Has chronic constipation with occasional abdominal pain. Tracy Wright is helping. Denies any issues with COVID-19. She is retired and lives with her . PMH/PSH/Allergies/Social History/medication list and most recent studies reviewed with patient. Tobacco use: No  Alcohol use: Social    Reports compliance with medications and diet. Trying to be active physically to control weight. Reports no other new c/o. HPI    Review of Systems   Constitutional: Negative. HENT: Negative. Eyes: Negative. Respiratory: Negative. Negative for shortness of breath. Cardiovascular: Negative. Negative for chest pain and leg swelling. Gastrointestinal: Positive for constipation. Negative for abdominal pain, blood in stool and melena. Genitourinary: Negative. Musculoskeletal: Positive for joint pain. Skin: Negative. Neurological: Negative. Endo/Heme/Allergies: Negative. Psychiatric/Behavioral: Negative for depression, hallucinations, memory loss, substance abuse and suicidal ideas. The patient is nervous/anxious and has insomnia. Physical Exam  Vitals and nursing note reviewed. Constitutional:       General: She is not in acute distress. Appearance: She is well-developed. She is obese. Comments: Pleasant  lady   HENT:      Head: Normocephalic and atraumatic.       Nose: Nose normal.      Mouth/Throat:      Mouth: Mucous membranes are moist.   Eyes:      Conjunctiva/sclera: Conjunctivae normal.      Comments: Small cataracts   Neck: Thyroid: No thyromegaly. Vascular: No JVD. Cardiovascular:      Rate and Rhythm: Normal rate and regular rhythm. Heart sounds: Normal heart sounds. No murmur heard. Pulmonary:      Effort: Pulmonary effort is normal. No respiratory distress. Breath sounds: Normal breath sounds. No wheezing or rales. Abdominal:      General: Bowel sounds are normal. There is no distension. Palpations: Abdomen is soft. Tenderness: There is no abdominal tenderness. Musculoskeletal:         General: No tenderness, deformity or signs of injury. Cervical back: Normal range of motion and neck supple. Right lower leg: No edema. Left lower leg: No edema. Skin:     General: Skin is warm and dry. Findings: No rash. Neurological:      Mental Status: She is alert and oriented to person, place, and time. Coordination: Coordination normal.   Psychiatric:         Behavior: Behavior normal.      Comments: Seems anxious         ASSESSMENT and PLAN  Diagnoses and all orders for this visit:    1. Acquired hypothyroidism  -     TSH 3RD GENERATION; Future    2. Essential hypertension  -     LIPID PANEL; Future  -     AST; Future  -     ALT; Future    3. Pure hypercholesterolemia    4. Chronic constipation    5. Insomnia, unspecified type    6. Anxiety      Follow-up and Dispositions    · Return in about 6 months (around 12/25/2021).      lab results and schedule of future lab studies reviewed with patient  reviewed diet, exercise and weight control  reviewed medications and side effects in detail  Monitor BP at home with goal of 140/90 or less  F/u with other MD's as scheduled  Advised patient to lose weight by watching diet (decreasing sugars/carbs/fat, increasing fruits/vegetables), exercising at least 30 minutes daily, getting 7-8 hours of sleep daily, drinking plenty of water, and decreasing stress  COVID-19 precautions discussed with pt  Discussed diagnosis, etiology and treatment of depression and anxiety in detail with patient  Not interested in medications at this point

## 2021-07-01 LAB
ALT SERPL-CCNC: 17 IU/L (ref 0–32)
AST SERPL-CCNC: 22 IU/L (ref 0–40)
CHOLEST SERPL-MCNC: 215 MG/DL (ref 100–199)
HDLC SERPL-MCNC: 69 MG/DL
IMP & REVIEW OF LAB RESULTS: NORMAL
LDLC SERPL CALC-MCNC: 132 MG/DL (ref 0–99)
TRIGL SERPL-MCNC: 80 MG/DL (ref 0–149)
TSH SERPL DL<=0.005 MIU/L-ACNC: 3.3 UIU/ML (ref 0.45–4.5)
VLDLC SERPL CALC-MCNC: 14 MG/DL (ref 5–40)

## 2021-07-02 NOTE — PROGRESS NOTES
Cholesterol mildly elevated. Improved some from previous. Recommend that patient watch diet for fatty foods and exercise as tolerated. Otherwise, stable labs.

## 2021-08-11 ENCOUNTER — VIRTUAL VISIT (OUTPATIENT)
Dept: INTERNAL MEDICINE CLINIC | Age: 61
End: 2021-08-11
Payer: COMMERCIAL

## 2021-08-11 DIAGNOSIS — S60.463S: ICD-10-CM
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
## 2021-08-11 DIAGNOSIS — E03.9 ACQUIRED HYPOTHYROIDISM: ICD-10-CM

## 2021-08-11 DIAGNOSIS — I10 ESSENTIAL HYPERTENSION: ICD-10-CM

## 2021-08-11 DIAGNOSIS — F41.9 ANXIETY: ICD-10-CM

## 2021-08-11 DIAGNOSIS — E16.2 HYPOGLYCEMIA: Primary | ICD-10-CM

## 2021-08-11 PROCEDURE — 99214 OFFICE O/P EST MOD 30 MIN: CPT | Performed by: INTERNAL MEDICINE

## 2021-08-11 NOTE — PROGRESS NOTES
Yosvany Oconnor (: 1960) is a 64 y.o. female, established patient, here for evaluation of the following chief complaint(s):   Insect Bite, Anxiety, Nausea, and Hypertension       ASSESSMENT/PLAN:  Below is the assessment and plan developed based on review of pertinent labs, studies, and medications. 1. Hypoglycemia  -     REFERRAL TO ENDOCRINOLOGY  2. Insect bite of left middle finger, sequela  3. Acquired hypothyroidism  -     REFERRAL TO ENDOCRINOLOGY  4. Essential hypertension  5. Anxiety      Return if symptoms worsen or fail to improve. SUBJECTIVE/OBJECTIVE:  Pt. is seen virtually for f/u. Has a few chronic medical issues as documented. Reports stable vitals at home. Reports recent insect bite of finger. Went to urgent care. Received antibiotics and Benadryl. Doing better now. Reports having anxiety issues especially in the morning. Recent labs through and mother physician showed low sugars. Concerned about hypoglycemia. Many days she feels fatigued, sweaty and shaky in the morning before eating. Eats 3 meals and snacks. Has had Covid vaccination. Taking precautions for Covid 19. Denies any related signs or symptoms including fever, cough, SOB or CP. PMH/PSH/Allergies/Social History/medication list and most recent studies reviewed with patient. Labs in 2020 were stable. Glucose was 83. Last TSH was 3.30. Has mild hyper cholesterolemia. Tobacco use: No  Alcohol use: Social    Reports compliance with medications and diet. Trying to be active physically to control weight. Reports no other new c/o.     Plan:  Continue current medications  Advised patient to have 3 meals with snacks in between  Advised patient to get a glucometer and monitor sugars 3 times daily and before meals for a few days as well as when having hypoglycemic symptoms  We will refer her to endocrinologist for second opinion  Watch diet and remain active physically  Follow-up with other MDs/specialists as scheduled  COVID-19 precautions discussed with pt  Follow-up as scheduled or as needed      Physical Exam    [INSTRUCTIONS:  \"[x]\" Indicates a positive item  \"[]\" Indicates a negative item  -- DELETE ALL ITEMS NOT EXAMINED]    Constitutional: [x] Appears well-developed and well-nourished [x] No apparent distress      [] Abnormal -     Mental status: [x] Alert and awake  [x] Oriented to person/place/time [x] Able to follow commands    [] Abnormal -     Eyes:   EOM    [x]  Normal    [] Abnormal -   Sclera  [x]  Normal    [] Abnormal -          Discharge [x]  None visible   [] Abnormal -     HENT: [x] Normocephalic, atraumatic  [] Abnormal -   [x] Mouth/Throat: Mucous membranes are moist    External Ears [x] Normal  [] Abnormal -    Neck: [x] No visualized mass [] Abnormal -     Pulmonary/Chest: [x] Respiratory effort normal   [x] No visualized signs of difficulty breathing or respiratory distress        [] Abnormal -      Musculoskeletal:   [x] Normal gait with no signs of ataxia         [x] Normal range of motion of neck        [] Abnormal -     Neurological:        [x] No Facial Asymmetry (Cranial nerve 7 motor function) (limited exam due to video visit)          [x] No gaze palsy        [] Abnormal -          Skin:        [x] No significant exanthematous lesions or discoloration noted on facial skin         [] Abnormal -            Psychiatric:       [x] Normal Affect [] Abnormal -        [x] No Hallucinations    Other pertinent observable physical exam findings:-        On this date 08/11/2021 I have spent 25   minutes reviewing previous notes, test results and face to face (virtual) with the patient discussing the diagnosis and importance of compliance with the treatment plan as well as documenting on the day of the visit. Susi Christiansoney, was evaluated through a synchronous (real-time) audio-video encounter. The patient (or guardian if applicable) is aware that this is a billable service.  Verbal consent to proceed has been obtained within the past 12 months. The visit was conducted pursuant to the emergency declaration under the [address] [place] [address] and the [employer] General Act. Patient identification was verified, and a caregiver was present when appropriate. The patient was located in a state where the provider was credentialed to provide care. An electronic signature was used to authenticate this note.   -- Janie Gabriel DO

## 2021-08-11 NOTE — PROGRESS NOTES
ADVISED PATIENT OF THE FOLLOWING HEALTH MAINTAINCE DUE  Health Maintenance Due   Topic Date Due    DTaP/Tdap/Td series (1 - Tdap) Never done    Shingrix Vaccine Age 50> (1 of 2) Never done      Chief Complaint   Patient presents with    Insect Bite    Anxiety    Nausea    Hypertension       1. Have you been to the ER, urgent care clinic since your last visit? Hospitalized since your last visit? Pt first     2. Have you seen or consulted any other health care providers outside of the [address] [place] [address] since your last visit? Include any DEXA scan, mammography  or colon screening. No    3. Do you have an Advance Directive on file? no    4. Do you have a DNR on file? no    Patient is accompanied by self I have received verbal consent from Nicolle Martinez to discuss any/all medical information while they are present in the room. No flowsheet data found. [address] [place] [address]  Phone: [phone] Fax: [phone] [address] [place] [address]  Phone: [phone] Fax: [phone]        Patient reminded during visit to bring all medication bottles, OTC medications to all appointments.

## 2021-08-13 ENCOUNTER — TELEPHONE (OUTPATIENT)
Dept: INTERNAL MEDICINE CLINIC | Age: 61
End: 2021-08-13

## 2021-08-13 NOTE — TELEPHONE ENCOUNTER
Call placed to patient and she stated she was stung x 2 last night and that her hand is swollen and red and itching, wanted to see if she needed antibiotics like pt first had given her. Advised that swelling redness and itching are typical post bee sting due to bee venom. Patient states that she is taking some benadryl and ice to area. Advised to continue to treat and monitor and if symptoms persist and/or worsen to seek treatment.  Advised that antibiotics do no assist in treating sx of bee sting but only if after insect bite results in infection, voiced understanding and appreciation

## 2021-08-13 NOTE — TELEPHONE ENCOUNTER
----- Message from [employer] sent at 8/13/2021 10:11 AM EDT -----  Regarding: Dr. Radha Marsh Message/Vendor Calls    Caller's first and last name: Olivia Shahid      Reason for call: callback request      Callback required yes/no and why: yes/pt request      Best contact number(s): [phone]      Details to clarify the request: pt was prescribed antibiotics for bee stings/pt has been stung an additional 2 more times and needs to know whether or not she needs more antibiotics called in      [employer] [facility]

## 2021-11-03 ENCOUNTER — TRANSCRIBE ORDER (OUTPATIENT)
Dept: SCHEDULING | Age: 61
End: 2021-11-03

## 2021-11-03 DIAGNOSIS — Z12.31 SCREENING MAMMOGRAM FOR HIGH-RISK PATIENT: Primary | ICD-10-CM

## 2021-12-27 ENCOUNTER — HOSPITAL ENCOUNTER (OUTPATIENT)
Dept: MAMMOGRAPHY | Age: 61
Discharge: HOME | End: 2021-12-27
Attending: INTERNAL MEDICINE
Payer: COMMERCIAL

## 2021-12-27 DIAGNOSIS — Z12.31 SCREENING MAMMOGRAM FOR HIGH-RISK PATIENT: ICD-10-CM

## 2021-12-27 PROCEDURE — 77067 SCR MAMMO BI INCL CAD: CPT

## 2021-12-28 ENCOUNTER — OFFICE VISIT (OUTPATIENT)
Dept: OBGYN CLINIC | Age: 61
End: 2021-12-28
Payer: COMMERCIAL

## 2021-12-28 ENCOUNTER — OFFICE VISIT (OUTPATIENT)
Dept: INTERNAL MEDICINE CLINIC | Age: 61
End: 2021-12-28

## 2021-12-28 VITALS
RESPIRATION RATE: 16 BRPM | HEIGHT: 63 IN | SYSTOLIC BLOOD PRESSURE: 115 MMHG | WEIGHT: 169 LBS | DIASTOLIC BLOOD PRESSURE: 80 MMHG | HEART RATE: 68 BPM | TEMPERATURE: 99 F | BODY MASS INDEX: 29.95 KG/M2 | OXYGEN SATURATION: 98 %

## 2021-12-28 VITALS
SYSTOLIC BLOOD PRESSURE: 144 MMHG | BODY MASS INDEX: 30.3 KG/M2 | DIASTOLIC BLOOD PRESSURE: 88 MMHG | WEIGHT: 171 LBS | HEIGHT: 63 IN

## 2021-12-28 DIAGNOSIS — E78.00 PURE HYPERCHOLESTEROLEMIA: ICD-10-CM

## 2021-12-28 DIAGNOSIS — Z01.419 ENCOUNTER FOR GYNECOLOGICAL EXAMINATION WITHOUT ABNORMAL FINDING: Primary | ICD-10-CM

## 2021-12-28 DIAGNOSIS — R68.82 LOW LIBIDO: ICD-10-CM

## 2021-12-28 DIAGNOSIS — G56.22 IMPINGEMENT OF LEFT ULNAR NERVE: ICD-10-CM

## 2021-12-28 DIAGNOSIS — I10 ESSENTIAL HYPERTENSION: ICD-10-CM

## 2021-12-28 DIAGNOSIS — D21.9 FIBROIDS: ICD-10-CM

## 2021-12-28 DIAGNOSIS — I10 ESSENTIAL HYPERTENSION: Primary | ICD-10-CM

## 2021-12-28 DIAGNOSIS — Z23 ENCOUNTER FOR IMMUNIZATION: ICD-10-CM

## 2021-12-28 DIAGNOSIS — E03.9 ACQUIRED HYPOTHYROIDISM: ICD-10-CM

## 2021-12-28 DIAGNOSIS — K59.09 CHRONIC CONSTIPATION: ICD-10-CM

## 2021-12-28 DIAGNOSIS — N95.2 VAGINAL ATROPHY: ICD-10-CM

## 2021-12-28 PROCEDURE — 99214 OFFICE O/P EST MOD 30 MIN: CPT | Performed by: INTERNAL MEDICINE

## 2021-12-28 PROCEDURE — 90686 IIV4 VACC NO PRSV 0.5 ML IM: CPT | Performed by: INTERNAL MEDICINE

## 2021-12-28 PROCEDURE — 99396 PREV VISIT EST AGE 40-64: CPT | Performed by: OBSTETRICS & GYNECOLOGY

## 2021-12-28 NOTE — PROGRESS NOTES
Annual Visit    Andrei Dalton is a 64 y.o. postmenopausal female  A0 presenting for annual visit. History of fibroids and associated pelvic bloating unchanged. US previously showing fibroids largely unchanged from prior scan. No change in symptoms. No PMB. Pt minimally symptomatic and does not desire intervention. H/o bilateral inguinal hernia repairs in ~2018. Only other concern today is low libido and vaginal dryness. Does use lubrication with adequate relief. Just is never in the mood.     Moved to [facility] from [place] to live closer to her 2 daughters. Has a 10 yo granddaughter in town and another grandchild on the way! TV ULTRASOUND 21  THE UTERUS IS ANTEVERTED, NORMAL IN SIZE AND HETEROGENOUS IN ECHOGENICITY. THERE APPEARS TO  BE MULTIPLE FIBROIDS SEEN THOUGHOUT THE UTERUS, ML SUBMUCOSAL VS. INTRACAVITARY MEASURING 33  X 24 MM. RT LAT INTRAMURAL MEASURING 45 X 40 MM. POSTERIOR CALCIFIED SUBSEROSAL MEASURING 31 X  25 MM. ML SUBMUCOSAL VS. INTRACAVITARY MEASURING 11 X 9 MM. THE ENDOMETRIUM MEASURES 26MM IN THICKNESS (but suspected intracavitary fibroid, and no PMB). NO MASSES OR ABNORMALITIES ARE SEEN. RIGHT OVARY APPEARS WNL. LEFT OVARY NOT SEEN DUE TO BOWEL GAS. LT ADN APPEARS WNL. NO FREE FLUID IS SEEN IN THE CDS.     TRANSVAGINAL ULTRASOUND PERFORMED 1/3/20. THE UTERUS IS ANTEVERTED,ENALRGED IN SIZE AND HETEROGENEOUS IN ECHOGENICITY. THERE  APPEARS TO BE MULTIPLE FIBROIDS SEEN. LARGEST ARE NOTED. RT LATERAL INTRAMURAL 44 X 44MM. LT LATERAL FUNDAL 33 X 30MM. INTRAMURAL VS. SUBMUCOSAL VS. INTRACAVITARY 25 X 26MM. LT  FUNDAL CALCIFIED 26 X 16MM. ML INTRAMURAL 27 X 27MM. UNABLE TO DETERMINE ENDOMETRIUM DUE TO MULTIPLE FIBROIDS AND FIBROID SHADOWING. BILATERAL OVARIES ARE NOT VISUALIZED DUE TO BOWEL GAS. RT AND LT ADNEXAS APPEAR WNL. NO FREE FLUID SEEN IN THE CDS.     Ob/Gyn Hx:   A0 - 3   [place]-57  VMS-denies  Vag dryness-present  STI- denies  ? SA-yes     Health maintenance:  Pap-12/10/19 NILM HPV Neg  Mammo-12/27/21 B1, 11/19/20 B1  Colonoscopy- 2021 per pt, hx of polyps    Past Medical History:   Diagnosis Date    Fibroids     Hypercholesterolemia     Hypertension     Hypothyroid     Inguinal hernia bilateral, non-recurrent     Menopause     Pregnancy     x 3 all vaginal births       Past Surgical History:   Procedure Laterality Date    HX HERNIA REPAIR  12/2018    Bilateral inguinal    HX ORTHOPAEDIC  2007    Left elbow surgery    HX TUBAL LIGATION  1991       Family History   Problem Relation Age of Onset    Diabetes Mother     Heart Disease Mother     Heart Disease Father     Lung Cancer Maternal Grandmother        Social History     Socioeconomic History    Marital status:      Spouse name: Not on file    Number of children: Not on file    Years of education: Not on file    Highest education level: Not on file   Occupational History    Not on file   Tobacco Use    Smoking status: Never Smoker    Smokeless tobacco: Never Used   Vaping Use    Vaping Use: Never used   Substance and Sexual Activity    Alcohol use: Yes     Alcohol/week: 2.0 - 4.0 standard drinks     Types: 1 - 2 Glasses of wine, 1 - 2 Cans of beer per week     Comment: Social/1-2 weekly    Drug use: Never    Sexual activity: Yes     Partners: Male     Birth control/protection: None   Other Topics Concern    Not on file   Social History Narrative    Not on file     Social Determinants of Health     Financial Resource Strain:     Difficulty of Paying Living Expenses: Not on file   Food Insecurity:     Worried About Running Out of Food in the Last Year: Not on file    Ashlee of Food in the Last Year: Not on file   Transportation Needs:     Lack of Transportation (Medical): Not on file    Lack of Transportation (Non-Medical):  Not on file   Physical Activity:     Days of Exercise per Week: Not on file    Minutes of Exercise per Session: Not on file   Stress:     Feeling of Stress : Not on file   Social Connections:     Frequency of Communication with Friends and Family: Not on file    Frequency of Social Gatherings with Friends and Family: Not on file    Attends Religion Services: Not on file    Active Member of Clubs or Organizations: Not on file    Attends Club or Organization Meetings: Not on file    Marital Status: Not on file   Intimate Partner Violence:     Fear of Current or Ex-Partner: Not on file    Emotionally Abused: Not on file    Physically Abused: Not on file    Sexually Abused: Not on file   Housing Stability:     Unable to Pay for Housing in the Last Year: Not on file    Number of [place] in the Last Year: Not on file    Unstable Housing in the Last Year: Not on file       Current Outpatient Medications   Medication Sig Dispense Refill    losartan-hydroCHLOROthiazide (HYZAAR) 100-25 mg per tablet TAKE 1 TABLET DAILY 90 Tablet 1    ezetimibe (ZETIA) 10 mg tablet TAKE 1 TABLET DAILY 90 Tablet 1    levothyroxine (SYNTHROID) 200 mcg tablet Take 1 Tablet by mouth Daily (before breakfast). 90 Tablet 1    linaCLOtide (Linzess) 145 mcg cap capsule TAKE 1 CAPSULE TWICE A DAY MONDAY, WEDNESDAY, AND FRIDAY AS NEEDED 90 Capsule 0    multivit,iron,minerals/lutein (CENTRUM SILVER ULTRA WOMEN'S PO) Take 1 Tab by mouth daily.  ascorbic acid, vitamin C, (VITAMIN C) 500 mg tablet Take 1,000 mg by mouth as needed.  (Patient not taking: Reported on 8/11/2021)         No Known Allergies    Review of Systems - History obtained from the patient  Constitutional: negative for weight loss, fever, night sweats  HEENT: negative for hearing loss, earache, congestion, snoring, sorethroat  CV: negative for chest pain, palpitations, edema  Resp: negative for cough, shortness of breath, wheezing  GI: negative for change in bowel habits, abdominal pain, black or bloody stools, +constipation, bloating  : negative for frequency, dysuria, hematuria, vaginal discharge, +pelvic pressure, vaginal dryness, vaginal odor  MSK: negative for back pain, joint pain, muscle pain  Breast: negative for breast lumps, nipple discharge, galactorrhea  Skin :negative for itching, rash, hives  Neuro: negative for dizziness, headache, confusion, weakness  Psych: negative for anxiety, depression, change in mood  Heme/lymph: negative for bleeding, bruising, pallor    Physical Exam  Visit Vitals  BP (!) 144/88   Ht 5' 3\" (1.6 m)   Wt 171 lb (77.6 kg)   BMI 30.29 kg/m²     PHYSICAL EXAMINATION    Constitutional  · Appearance: well-nourished, well developed, alert, in no acute distress    HENT  · Head and Face: appears normal    Neck  · Inspection/Palpation: normal appearance, no masses or tenderness  · Lymph Nodes: no lymphadenopathy present  · Thyroid: gland size normal, nontender, no nodules or masses present on palpation    Chest  · Respiratory Effort: breathing non-labored  · Auscultation: normal breath sounds    Cardiovascular  · Heart:  · Auscultation: regular rate and rhythm without murmur    Breasts  · Inspection of Breasts: breasts symmetrical, no skin changes, no discharge present, nipple appearance normal, no skin retraction present  · Palpation of Breasts and Axillae: no masses present on palpation, no breast tenderness  · Axillary Lymph Nodes: no lymphadenopathy present    Gastrointestinal  · Abdominal Examination: abdomen non-tender to palpation, normal bowel sounds, no masses present  · Liver and spleen: no hepatomegaly present, spleen not palpable  · Hernias: no hernias identified    Genitourinary  · External Genitalia: normal appearance for age, no discharge present, no tenderness present, no inflammatory lesions present, no masses present, no atrophy present  · Vagina: normal vaginal vault without central or paravaginal defects, no discharge present, no inflammatory lesions present, no masses present  · Bladder: non-tender to palpation  · Urethra: appears normal  · Cervix: normal - small ~0.5cm nabothian cyst vs cervical fibroid at 6 o'clock position on cervix, stable/unchanged, overall benign appearance  · Uterus: enlarged ~10 week sized fibroid uterus, non-tender, unchanged (if anything feels smaller)  · Uterus: normal size, shape and consistency  · Adnexa: no adnexal tenderness present, no adnexal masses present  · Perineum: perineum within normal limits, no evidence of trauma, no rashes or skin lesions present  · Anus: anus within normal limits, no hemorrhoids present  · Inguinal Lymph Nodes: no lymphadenopathy present    Skin  · General Inspection: no rash, no lesions identified    Neurologic/Psychiatric  · Mental Status:  · Orientation: grossly oriented to person, place and time  · Mood and Affect: mood normal, affect appropriate      Assessment/Plan:  64 y.o. postmenopausal female presenting for annual visit. History of pelvic pressure/bloating associated with fibroid uterus. Low libido and vag dryness, otherwise doing well.     Health maintenance:  -diet/exercise/healthy lifestyle  -pap/HPV utd, repeat 2022  -STI screen declined   -mammography utd, repeat 1 year  -colonoscopy utd, repeat per GI recs  -refer for dexa scan age 72  -discussed moisturizers and lubricants  -discussed HSDD and referral to sex therapist provided, discussed ways to improve sex drive  -advised f/u with PCP re: elevated BP (was normotensive at PCP office earlier today)    RTC: for AE or sooner ella Tom MD  12/28/2021  2:09 PM

## 2021-12-28 NOTE — PROGRESS NOTES
Sharad Mcfadden is a 64 y.o. female  who presents for routine immunization(s). Patient denies any symptoms , reactions or allergies that would exclude them from being immunized today. Risks and adverse reactions were discussed. The patient/caregiver was provided the VIS and allotted time to read and ask questions prior to administration of vaccine. Patient voiced full understanding and signed Adult Immunization Consent form. All questions were addressed. Patient was observed for 10 min post injection. There were no reactions observed.         Results for orders placed or performed in visit on 06/25/21   LIPID PANEL   Result Value Ref Range    Cholesterol, total 215 (H) 100 - 199 mg/dL    Triglyceride 80 0 - 149 mg/dL    HDL Cholesterol 69 >39 mg/dL    VLDL, calculated 14 5 - 40 mg/dL    LDL, calculated 132 (H) 0 - 99 mg/dL   TSH 3RD GENERATION   Result Value Ref Range    TSH 3.300 0.450 - 4.500 uIU/mL   AST   Result Value Ref Range    AST (SGOT) 22 0 - 40 IU/L   ALT   Result Value Ref Range    ALT (SGPT) 17 0 - 32 IU/L   CVD REPORT   Result Value Ref Range    INTERPRETATION Note

## 2021-12-28 NOTE — PROGRESS NOTES
HISTORY OF PRESENT ILLNESS  Sourav Romeo is a 64 y.o. female. Pt. comes in for f/u. Has a few chronic medical issues as documented including HTN, HLD, hypothyroidism, constipation. Vital signs are stable. BMI is 29.94. Scheduled for cataract surgery over the next couple months. Reports some issues with left elbow forearm. Has had previous left ulnar impingement surgery. [facility] is helping her chronic constipation. Lives with her . Has had Covid-19 vaccination. Reports taking proper precautions. Denies any related signs or symptoms. PMH/PSH/Allergies/Social History/medication list and most recent studies reviewed with patient. Labs have been stable. Tobacco use: No  Alcohol use: Social    Reports compliance with medications and diet. Trying to be active physically to control weight. Reports no other new c/o. HPI    Review of Systems   Constitutional: Negative. HENT: Negative. Eyes: Negative. Respiratory: Negative. Negative for shortness of breath. Cardiovascular: Negative. Negative for chest pain and leg swelling. Gastrointestinal: Positive for constipation. Negative for abdominal pain, blood in stool and melena. Genitourinary: Negative. Musculoskeletal: Positive for joint pain. Skin: Negative. Neurological: Negative. Endo/Heme/Allergies: Negative. Psychiatric/Behavioral: Negative for depression, hallucinations, memory loss, substance abuse and suicidal ideas. The patient is nervous/anxious and has insomnia. Physical Exam  Vitals and nursing note reviewed. Constitutional:       General: She is not in acute distress. Appearance: She is well-developed. She is obese. Comments: Pleasant  lady   HENT:      Head: Normocephalic and atraumatic.       Nose: Nose normal.      Mouth/Throat:      Mouth: Mucous membranes are moist.   Eyes:      Conjunctiva/sclera: Conjunctivae normal.      Comments: Small cataracts   Neck:      Thyroid: No thyromegaly. Vascular: No JVD. Cardiovascular:      Rate and Rhythm: Normal rate and regular rhythm. Heart sounds: Normal heart sounds. No murmur heard. Pulmonary:      Effort: Pulmonary effort is normal. No respiratory distress. Breath sounds: Normal breath sounds. No wheezing or rales. Abdominal:      General: Bowel sounds are normal. There is no distension. Palpations: Abdomen is soft. Tenderness: There is no abdominal tenderness. Musculoskeletal:         General: No tenderness, deformity or signs of injury. Cervical back: Normal range of motion and neck supple. Right lower leg: No edema. Left lower leg: No edema. Skin:     General: Skin is warm and dry. Findings: No rash. Neurological:      Mental Status: She is alert and oriented to person, place, and time. Coordination: Coordination normal.   Psychiatric:         Behavior: Behavior normal.      Comments: Seems anxious         ASSESSMENT and PLAN  Diagnoses and all orders for this visit:    1. Essential hypertension  -     CBC W/O DIFF; Future  Stable chronic condition. Continue current treatment/medications. Monitor BP at home with goal of 140/90 or less    2. Acquired hypothyroidism  -     TSH 3RD GENERATION; Future  Stable chronic condition. Continue current treatment/medications. 3. Pure hypercholesterolemia  -     LIPID PANEL; Future  -     METABOLIC PANEL, COMPREHENSIVE; Future  -     CBC W/O DIFF; Future  Stable chronic condition. Continue current treatment/medications. 4. Chronic constipation  Stable chronic condition. Continue current treatment/medications. 5. Encounter for immunization  -     INFLUENZA VIRUS VAC QUAD,SPLIT,PRESV FREE SYRINGE IM    6. 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      All chronic medical problems are stable  Continue with current medical management and plan  lab results and schedule of future lab studies reviewed with patient  reviewed diet, exercise and weight control  reviewed medications and side effects in detail  F/u with other MD's/ providers as scheduled  COVID-19 precautions discussed with pt  An After Visit Summary was printed and given to the patient.

## 2021-12-28 NOTE — PROGRESS NOTES
Health Maintenance Due   Topic Date Due    DTaP/Tdap/Td series (1 - Tdap) Never done    Shingrix Vaccine Age 50> (1 of 2) Never done    COVID-19 Vaccine (2 - Booster for Babelverse series) 06/02/2021    Flu Vaccine (1) 09/01/2021       Chief Complaint   Patient presents with    Anxiety    Hypertension    Hypothyroidism       1. Have you been to the ER, urgent care clinic since your last visit? Hospitalized since your last visit? No    2. Have you seen or consulted any other health care providers outside of the [address] [place] [address] since your last visit? Include any pap smears or colon screening. No    3) Do you have an Advance Directive on file? no    4) Are you interested in receiving information on Advance Directives? NO      Patient is accompanied by spouse I have received verbal consent from Eric Kern to discuss any/all medical information while they are present in the room.

## 2021-12-28 NOTE — PATIENT INSTRUCTIONS
Pelvic Exam: Care Instructions  Overview     When your doctor examines your pelvic organs, it's called a pelvic exam. This exam is done to evaluate symptoms, such as pelvic pain or abnormal vaginal bleeding and discharge. It may also be done to collect samples of cells for cervical cancer screening. Before your exam, it's important to share some information with your doctor. You can talk about any concerns you may have. Your doctor will also want to know if you are pregnant or use birth control. And your doctor will want to hear about any problems, surgeries, or procedures you have had in your pelvic area. You will also need to tell your doctor when your last period was. Follow-up care is a key part of your treatment and safety. Be sure to make and go to all appointments, and call your doctor if you are having problems. It's also a good idea to know your test results and keep a list of the medicines you take. How is a pelvic exam done? · During a pelvic exam, you will:  ? Take off your clothes below the waist. You will get a paper or cloth cover to put over the lower half of your body. ? Lie on your back on an exam table with your feet and legs supported by footrests. · The doctor may:  ? Ask you to relax your knees. Your knees need to lean out, toward the walls. ? Check the opening of your vagina for sores or swelling. ? Gently put a tool called a speculum into your vagina. It opens the vagina a little bit. You may feel some pressure. The speculum lets your doctor see inside the vagina. ? Use a small brush, spatula, or swab to get a sample for testing. The doctor then removes the speculum. ? Put on gloves and put one or two fingers of one hand into your vagina. The other hand goes on your lower belly. This lets your doctor feel your pelvic organs. You will probably feel some pressure. ? Put one gloved finger into your rectum and one into your vagina, if needed.  This can also help check your pelvic organs. You may have a small amount of vaginal discharge or bleeding after the exam.  Why is a pelvic exam done? A pelvic exam may be done:  · To collect samples of cells for cervical cancer screening. · To check for vaginal infection. · To check for sexually transmitted infections, such as chlamydia or herpes. · To help find the cause of abnormal uterine bleeding. · To look for problems like uterine fibroids, ovarian cysts, or uterine prolapse. · To help find the cause of pelvic or belly pain. · Before inserting an intrauterine device (IUD). · To collect evidence if you've been sexually assaulted. What are the risks of a pelvic exam?  There is a small chance that the doctor will find something on a pelvic exam that would not have caused a problem. This is called overdiagnosis. It could lead to tests or treatment you don't need. When should you call for help? Watch closely for changes in your health, and be sure to contact your doctor if you have any problems. Where can you learn more? Go to http://www.gray.com/  Enter M421 in the search box to learn more about \"Pelvic Exam: Care Instructions. \"  Current as of: February 11, 2021               Content Version: 13.0  © 8214-9322 [employer]. Care instructions adapted under license by [employer] (which disclaims liability or warranty for this information). If you have questions about a medical condition or this instruction, always ask your healthcare professional. [place] [address] any warranty or liability for your use of this information.

## 2021-12-30 LAB
ALBUMIN SERPL-MCNC: 4.5 G/DL (ref 3.8–4.8)
ALBUMIN/GLOB SERPL: 1.6 {RATIO} (ref 1.2–2.2)
ALP SERPL-CCNC: 66 IU/L (ref 44–121)
ALT SERPL-CCNC: 15 IU/L (ref 0–32)
AST SERPL-CCNC: 18 IU/L (ref 0–40)
BILIRUB SERPL-MCNC: <0.2 MG/DL (ref 0–1.2)
BUN SERPL-MCNC: 13 MG/DL (ref 8–27)
BUN/CREAT SERPL: 15 (ref 12–28)
CALCIUM SERPL-MCNC: 9.8 MG/DL (ref 8.7–10.3)
CHLORIDE SERPL-SCNC: 105 MMOL/L (ref 96–106)
CHOLEST SERPL-MCNC: 217 MG/DL (ref 100–199)
CO2 SERPL-SCNC: 26 MMOL/L (ref 20–29)
CREAT SERPL-MCNC: 0.86 MG/DL (ref 0.57–1)
ERYTHROCYTE [DISTWIDTH] IN BLOOD BY AUTOMATED COUNT: 12.4 % (ref 11.7–15.4)
GLOBULIN SER CALC-MCNC: 2.9 G/DL (ref 1.5–4.5)
GLUCOSE SERPL-MCNC: 87 MG/DL (ref 65–99)
HCT VFR BLD AUTO: 36.4 % (ref 34–46.6)
HDLC SERPL-MCNC: 60 MG/DL
HGB BLD-MCNC: 11.7 G/DL (ref 11.1–15.9)
IMP & REVIEW OF LAB RESULTS: NORMAL
LDLC SERPL CALC-MCNC: 137 MG/DL (ref 0–99)
MCH RBC QN AUTO: 26.5 PG (ref 26.6–33)
MCHC RBC AUTO-ENTMCNC: 32.1 G/DL (ref 31.5–35.7)
MCV RBC AUTO: 82 FL (ref 79–97)
PLATELET # BLD AUTO: 265 X10E3/UL (ref 150–450)
POTASSIUM SERPL-SCNC: 4 MMOL/L (ref 3.5–5.2)
PROT SERPL-MCNC: 7.4 G/DL (ref 6–8.5)
RBC # BLD AUTO: 4.42 X10E6/UL (ref 3.77–5.28)
SODIUM SERPL-SCNC: 144 MMOL/L (ref 134–144)
TRIGL SERPL-MCNC: 114 MG/DL (ref 0–149)
TSH SERPL DL<=0.005 MIU/L-ACNC: 0.58 UIU/ML (ref 0.45–4.5)
VLDLC SERPL CALC-MCNC: 20 MG/DL (ref 5–40)
WBC # BLD AUTO: 5.5 X10E3/UL (ref 3.4–10.8)

## 2022-01-04 NOTE — PROGRESS NOTES
Cholesterol is elevated. Recommend that patient watch diet for fatty foods and exercise as tolerated.      Otherwise, stable labs

## 2022-01-26 ENCOUNTER — OFFICE VISIT (OUTPATIENT)
Dept: INTERNAL MEDICINE CLINIC | Age: 62
End: 2022-01-26
Payer: COMMERCIAL

## 2022-01-26 VITALS
BODY MASS INDEX: 29.41 KG/M2 | HEART RATE: 66 BPM | DIASTOLIC BLOOD PRESSURE: 72 MMHG | TEMPERATURE: 98 F | HEIGHT: 63 IN | OXYGEN SATURATION: 99 % | SYSTOLIC BLOOD PRESSURE: 126 MMHG | WEIGHT: 166 LBS | RESPIRATION RATE: 16 BRPM

## 2022-01-26 DIAGNOSIS — Z01.818 PREOP EXAMINATION: ICD-10-CM

## 2022-01-26 DIAGNOSIS — H25.9 AGE-RELATED CATARACT OF BOTH EYES, UNSPECIFIED AGE-RELATED CATARACT TYPE: Primary | ICD-10-CM

## 2022-01-26 DIAGNOSIS — E03.9 ACQUIRED HYPOTHYROIDISM: ICD-10-CM

## 2022-01-26 DIAGNOSIS — I10 ESSENTIAL HYPERTENSION: ICD-10-CM

## 2022-01-26 PROCEDURE — 99214 OFFICE O/P EST MOD 30 MIN: CPT | Performed by: INTERNAL MEDICINE

## 2022-01-26 NOTE — PROGRESS NOTES
Health Maintenance Due   Topic Date Due    DTaP/Tdap/Td series (1 - Tdap) Never done    Shingrix Vaccine Age 50> (1 of 2) Never done    COVID-19 Vaccine (2 - Booster for Johanne Fore series) 06/02/2021       Chief Complaint   Patient presents with    Pre-op Exam    Hypertension    GERD       1. Have you been to the ER, urgent care clinic since your last visit? Hospitalized since your last visit? No    2. Have you seen or consulted any other health care providers outside of the [address] [place] [address] since your last visit? Include any pap smears or colon screening. No    3) Do you have an Advance Directive on file? no    4) Are you interested in receiving information on Advance Directives? NO      Patient is accompanied by self I have received verbal consent from Servando Bedoya to discuss any/all medical information while they are present in the room.

## 2022-01-26 NOTE — LETTER
1/26/2022 9:12 AM    Ms. Tamar Moreno [address] [facility] [address]    No Known Allergies     Current Outpatient Medications   Medication Sig    losartan-hydroCHLOROthiazide (HYZAAR) 100-25 mg per tablet TAKE 1 TABLET DAILY    ezetimibe (ZETIA) 10 mg tablet TAKE 1 TABLET DAILY    levothyroxine (SYNTHROID) 200 mcg tablet Take 1 Tablet by mouth Daily (before breakfast).  linaCLOtide (Linzess) 145 mcg cap capsule TAKE 1 CAPSULE TWICE A DAY MONDAY, WEDNESDAY, AND FRIDAY AS NEEDED    multivit,iron,minerals/lutein (CENTRUM SILVER ULTRA WOMEN'S PO) Take 1 Tab by mouth daily.  ascorbic acid, vitamin C, (VITAMIN C) 500 mg tablet Take 1,000 mg by mouth as needed. No current facility-administered medications for this visit.        Past Medical History:   Diagnosis Date    Fibroids     Hypercholesterolemia     Hypertension     Hypothyroid     Inguinal hernia bilateral, non-recurrent     Menopause     Pregnancy     x 3 all vaginal births     Past Surgical History:   Procedure Laterality Date    HX HERNIA REPAIR  12/2018    Bilateral inguinal    HX ORTHOPAEDIC  2007    Left elbow surgery    HX TUBAL LIGATION  1991         Sincerely,      Sarah Bonner DO

## 2022-01-26 NOTE — PROGRESS NOTES
HISTORY OF PRESENT ILLNESS  Danya Browne is a 64 y.o. female. Pt. comes in for for preop clearance exam for left cataract surgery on 2/14/2022 by Dr. Jazzy Beckham. Also has a right cataract to be done later. Vision has been getting worse. PMH of HTN, HLD, hypothyroidism, constipation. NKDA. All chronic medical issues have been stable. Reports having some anxiety especially in the mornings. Reports some stress. Has had Covid-19 vaccination. Reports taking proper precautions. Denies any related signs or symptoms. PMH/PSH/Allergies/Social History/medication list and most recent studies reviewed with patient. Recent labs are all stable. Tobacco use: No  Alcohol use: Social    Reports compliance with medications and diet. Trying to be active physically to control weight. Reports no other new c/o. HPI    Review of Systems   Constitutional: Negative. HENT: Negative. Eyes: Positive for blurred vision. Respiratory: Negative. Negative for shortness of breath. Cardiovascular: Negative. Negative for chest pain and leg swelling. Gastrointestinal: Positive for constipation. Negative for abdominal pain, blood in stool and melena. Genitourinary: Negative. Musculoskeletal: Positive for joint pain. Skin: Negative. Neurological: Negative. Endo/Heme/Allergies: Negative. Psychiatric/Behavioral: Negative for depression, hallucinations, memory loss, substance abuse and suicidal ideas. The patient is nervous/anxious and has insomnia. Physical Exam  Vitals and nursing note reviewed. Constitutional:       General: She is not in acute distress. Appearance: She is well-developed. She is obese. Comments: Pleasant  lady   HENT:      Head: Normocephalic and atraumatic. Right Ear: Tympanic membrane normal.      Left Ear: Tympanic membrane normal.      Mouth/Throat:      Mouth: Mucous membranes are moist.   Eyes:      General: No scleral icterus.      Extraocular Movements: Extraocular movements intact. Conjunctiva/sclera: Conjunctivae normal.      Pupils: Pupils are equal, round, and reactive to light. Comments: Bilateral cataracts   Neck:      Thyroid: No thyromegaly. Vascular: No carotid bruit or JVD. Cardiovascular:      Rate and Rhythm: Normal rate and regular rhythm. Heart sounds: Normal heart sounds. No murmur heard. Pulmonary:      Effort: Pulmonary effort is normal. No respiratory distress. Breath sounds: Normal breath sounds. No wheezing or rales. Abdominal:      General: Bowel sounds are normal. There is no distension. Palpations: Abdomen is soft. Tenderness: There is no abdominal tenderness. Musculoskeletal:         General: No tenderness. Cervical back: Normal range of motion and neck supple. Right lower leg: No edema. Left lower leg: No edema. Skin:     General: Skin is warm and dry. Findings: No rash. Neurological:      Mental Status: She is alert and oriented to person, place, and time. Coordination: Coordination normal.   Psychiatric:         Behavior: Behavior normal.      Comments: Seems anxious         ASSESSMENT and PLAN  Diagnoses and all orders for this visit:    1. Age-related cataract of both eyes, unspecified age-related cataract type  Medically stable for cataract surgery  Preop form filled and faxed to ophthalmologist  2. Preop examination    3. Essential hypertension  Stable chronic condition. Continue current treatment/medications. Monitor BP at home with goal of 140/90 or less    4. Acquired hypothyroidism  Stable chronic condition. Continue current treatment/medications. Follow-up and Dispositions    · Return if symptoms worsen or fail to improve.      All chronic medical problems are stable  Continue with current medical management and plan  lab results and schedule of future lab studies reviewed with patient  reviewed diet, exercise and weight control  reviewed medications and side effects in detail  F/u with other MD's/ providers as scheduled  COVID-19 precautions discussed with pt  An After Visit Summary was printed and given to the patient.

## 2022-03-18 PROBLEM — E78.00 PURE HYPERCHOLESTEROLEMIA: Status: ACTIVE | Noted: 2019-05-07

## 2022-03-18 PROBLEM — D21.9 FIBROIDS: Status: ACTIVE | Noted: 2019-11-11

## 2022-03-18 PROBLEM — K21.9 GASTROESOPHAGEAL REFLUX DISEASE WITHOUT ESOPHAGITIS: Status: ACTIVE | Noted: 2020-12-29

## 2022-03-18 PROBLEM — G56.22 IMPINGEMENT OF LEFT ULNAR NERVE: Status: ACTIVE | Noted: 2021-12-28

## 2022-03-19 PROBLEM — F41.9 ANXIETY: Status: ACTIVE | Noted: 2021-06-25

## 2022-03-19 PROBLEM — E03.9 ACQUIRED HYPOTHYROIDISM: Status: ACTIVE | Noted: 2019-05-07

## 2022-03-19 PROBLEM — I10 ESSENTIAL HYPERTENSION: Status: ACTIVE | Noted: 2019-05-07

## 2022-03-19 PROBLEM — K59.09 CHRONIC CONSTIPATION: Status: ACTIVE | Noted: 2019-05-07

## 2022-03-19 PROBLEM — G47.00 INSOMNIA, UNSPECIFIED TYPE: Status: ACTIVE | Noted: 2021-06-25

## 2022-03-20 PROBLEM — K63.5 POLYP OF COLON: Status: ACTIVE | Noted: 2019-05-07

## 2022-05-20 ENCOUNTER — TELEPHONE (OUTPATIENT)
Dept: INTERNAL MEDICINE CLINIC | Age: 62
End: 2022-05-20

## 2022-05-20 ENCOUNTER — OFFICE VISIT (OUTPATIENT)
Dept: INTERNAL MEDICINE CLINIC | Age: 62
End: 2022-05-20
Payer: COMMERCIAL

## 2022-05-20 VITALS
RESPIRATION RATE: 16 BRPM | BODY MASS INDEX: 30.12 KG/M2 | WEIGHT: 170 LBS | HEIGHT: 63 IN | TEMPERATURE: 98 F | DIASTOLIC BLOOD PRESSURE: 80 MMHG | HEART RATE: 67 BPM | OXYGEN SATURATION: 100 % | SYSTOLIC BLOOD PRESSURE: 122 MMHG

## 2022-05-20 DIAGNOSIS — E03.9 ACQUIRED HYPOTHYROIDISM: ICD-10-CM

## 2022-05-20 DIAGNOSIS — H25.9 SENILE CATARACT OF RIGHT EYE, UNSPECIFIED AGE-RELATED CATARACT TYPE: Primary | ICD-10-CM

## 2022-05-20 DIAGNOSIS — I10 ESSENTIAL HYPERTENSION: ICD-10-CM

## 2022-05-20 DIAGNOSIS — E78.00 PURE HYPERCHOLESTEROLEMIA: ICD-10-CM

## 2022-05-20 DIAGNOSIS — Z01.818 PREOP EXAMINATION: ICD-10-CM

## 2022-05-20 PROCEDURE — 99214 OFFICE O/P EST MOD 30 MIN: CPT | Performed by: INTERNAL MEDICINE

## 2022-05-30 NOTE — PROGRESS NOTES
HISTORY OF PRESENT ILLNESS  iCsco Bain is a 64 y.o. female. Pt. comes in for for preop clearance exam for right cataract surgery by Dr. Aimee Rodriguez. Had a left cataract done a few months ago. Vision has been getting worse. Vital signs and weight are stable. BMI 30.1. PMH of HTN, HLD, hypothyroidism, constipation. NKDA. All chronic medical issues have been stable. Has had Covid-19 vaccination. Reports taking proper precautions. Denies any related signs or symptoms. PMH/PSH/Allergies/Social History/medication list and most recent studies reviewed with patient. Recent labs are all stable. Tobacco use: No  Alcohol use: Social  Reports compliance with medications and diet. Trying to be active physically to control weight. Reports no other new c/o. HPI    Review of Systems   Constitutional: Negative. HENT: Negative. Eyes: Positive for blurred vision. Respiratory: Negative. Negative for shortness of breath. Cardiovascular: Negative. Negative for chest pain and leg swelling. Gastrointestinal: Positive for constipation. Negative for abdominal pain, blood in stool and melena. Genitourinary: Negative. Musculoskeletal: Positive for joint pain. Skin: Negative. Neurological: Negative. Endo/Heme/Allergies: Negative. Psychiatric/Behavioral: Negative for depression, hallucinations, memory loss, substance abuse and suicidal ideas. The patient is nervous/anxious and has insomnia. Physical Exam  Vitals and nursing note reviewed. Constitutional:       General: She is not in acute distress. Appearance: She is well-developed. She is obese. Comments: Pleasant  lady   HENT:      Head: Normocephalic and atraumatic. Right Ear: Tympanic membrane normal.      Left Ear: Tympanic membrane normal.      Mouth/Throat:      Mouth: Mucous membranes are moist.   Eyes:      General: No scleral icterus. Extraocular Movements: Extraocular movements intact.       Conjunctiva/sclera: Conjunctivae normal.      Pupils: Pupils are equal, round, and reactive to light. Comments: Right cataract   Neck:      Thyroid: No thyromegaly. Vascular: No carotid bruit or JVD. Cardiovascular:      Rate and Rhythm: Normal rate and regular rhythm. Heart sounds: Normal heart sounds. No murmur heard. Pulmonary:      Effort: Pulmonary effort is normal. No respiratory distress. Breath sounds: Normal breath sounds. No wheezing or rales. Abdominal:      General: Bowel sounds are normal. There is no distension. Palpations: Abdomen is soft. Tenderness: There is no abdominal tenderness. Musculoskeletal:         General: No tenderness. Cervical back: Normal range of motion and neck supple. Right lower leg: No edema. Left lower leg: No edema. Skin:     General: Skin is warm and dry. Findings: No rash. Neurological:      Mental Status: She is alert and oriented to person, place, and time. Coordination: Coordination normal.   Psychiatric:         Behavior: Behavior normal.      Comments: Seems anxious         ASSESSMENT and PLAN  Diagnoses and all orders for this visit:    1. Senile cataract of right eye, unspecified age-related cataract type  Medically stable for eye surgery  Preop form filled and faxed to ophthalmologist  2. Preop examination    3. Acquired hypothyroidism  Stable chronic condition. Continue current treatment/medications. 4. Essential hypertension  Monitor BP at home with goal of 140/90 or less   Stable chronic condition. Continue current treatment/medications. 5. Pure hypercholesterolemia  Stable chronic condition. Continue current treatment/medications. Follow-up and Dispositions    · Return in about 6 months (around 11/20/2022).      All chronic medical problems are stable  Continue with current medical management and plan  lab results and schedule of future lab studies reviewed with patient  reviewed diet, exercise and weight control  reviewed medications and side effects in detail  F/u with other MD's/ providers as scheduled  COVID-19 precautions discussed with pt  An After Visit Summary was printed and given to the patient.

## 2022-06-01 ENCOUNTER — TELEPHONE (OUTPATIENT)
Dept: INTERNAL MEDICINE CLINIC | Age: 62
End: 2022-06-01

## 2022-06-01 NOTE — TELEPHONE ENCOUNTER
Patients insurance is requesting a prior authoriztion for Zetia 10 mg tablet. Patient also wanted to note that she can not take statin for cholesterol because it causes leg cramps. Patient stated that she is completely out of the Zetia as well.      Please advise

## 2022-06-02 NOTE — TELEPHONE ENCOUNTER
1055: Called insurance prior approval as no key came in for [email or web address]  1100: Placed on hold waiting for representative  1107: spoke to representative, does not require PA     Reference number: N/a    1111: left message for patient

## 2022-06-12 DIAGNOSIS — E78.00 PURE HYPERCHOLESTEROLEMIA: Primary | ICD-10-CM

## 2022-06-13 RX ORDER — EZETIMIBE 10 MG/1
10 TABLET ORAL DAILY
Qty: 90 TABLET | Refills: 1 | Status: SHIPPED | OUTPATIENT
Start: 2022-06-13

## 2022-06-13 NOTE — TELEPHONE ENCOUNTER
Pt requesting the generic of Zetia 10mg this medication since this is what her insurance covers. Prior auth requested for name brand on 6/1/22. Pt  is out of this medication    Last labs done 12/21  Does provider want pt to get labs drawn again before next visit 11/22?

## 2022-06-16 ENCOUNTER — TELEPHONE (OUTPATIENT)
Dept: INTERNAL MEDICINE CLINIC | Age: 62
End: 2022-06-16

## 2022-06-16 NOTE — TELEPHONE ENCOUNTER
ezetemibe is not covered  Alternative suggestions: pravastatin tab 10mg 20mg 40mg 80mg  simvistatin 5mg, 10mg, 20, 40,80mg  Atorvastatin 10mg, 20,40,80 mg  resuvastatin 5mg,10,20,40mg

## 2022-06-16 NOTE — TELEPHONE ENCOUNTER
Please call [phone] for prior auth/ [facility]- (verbal initiation for prior auth.) Pt wants an update once this call is placed so she can follow through with her insurance.   Pt states she is unable to take the statins the pharmacy listed in prev call

## 2022-06-17 NOTE — TELEPHONE ENCOUNTER
Called [employer]. Case Prior Auth started, #[id number].      Spoke with agent,     [id number] Approved start date of 05/18/22 end date 06/17/23

## 2022-06-17 NOTE — TELEPHONE ENCOUNTER
CHRISTI NIXON Key: [id number] help? Call us at [phone]  Outcome  Additional Information Required  Your PA has been resolved, no additional PA is required. For further inquiries please contact the number on the back of the member prescription card.  (Message [phone])

## 2022-06-20 NOTE — TELEPHONE ENCOUNTER
Ora Santana, DO  You 3 days ago     IM    Switch to Lipitor 10 mg 1 nightly   Patient told me that she prefers not to take a statin    Message text

## 2022-06-21 NOTE — TELEPHONE ENCOUNTER
Ramona Carmencita was called and verbalized understanding on note below. She states that she was on several statins and legs cramps and DVTs were an issue. Memo Jorge states that the medication has actually been approved and it is being sent.

## 2022-06-28 ENCOUNTER — OFFICE VISIT (OUTPATIENT)
Dept: INTERNAL MEDICINE CLINIC | Age: 62
End: 2022-06-28
Payer: COMMERCIAL

## 2022-06-28 VITALS
BODY MASS INDEX: 30.26 KG/M2 | SYSTOLIC BLOOD PRESSURE: 136 MMHG | WEIGHT: 170.8 LBS | TEMPERATURE: 99.2 F | RESPIRATION RATE: 20 BRPM | OXYGEN SATURATION: 99 % | DIASTOLIC BLOOD PRESSURE: 86 MMHG | HEIGHT: 63 IN | HEART RATE: 78 BPM

## 2022-06-28 DIAGNOSIS — E03.9 ACQUIRED HYPOTHYROIDISM: ICD-10-CM

## 2022-06-28 DIAGNOSIS — E78.00 PURE HYPERCHOLESTEROLEMIA: Primary | ICD-10-CM

## 2022-06-28 DIAGNOSIS — I10 ESSENTIAL HYPERTENSION: ICD-10-CM

## 2022-06-28 DIAGNOSIS — R20.2 PARESTHESIA OF FOOT, BILATERAL: ICD-10-CM

## 2022-06-28 DIAGNOSIS — K59.09 CHRONIC CONSTIPATION: ICD-10-CM

## 2022-06-28 PROCEDURE — 99214 OFFICE O/P EST MOD 30 MIN: CPT | Performed by: INTERNAL MEDICINE

## 2022-06-28 NOTE — PROGRESS NOTES
HISTORY OF PRESENT ILLNESS  Perry Meza is a 64 y.o. female. Pt. comes in for f/u. Has a few chronic medical issues as documented including HTN, HLD, hypothyroidism, constipation. Vital signs are stable. BMI 30.26. Weight is stable. Reports bilateral foot pain and paresthesia at nighttime. No other focal neurological issues. Denies any trauma. All chronic medical issues are stable on current treatment regimen. Has had Covid-19 vaccination. Reports taking proper precautions. Denies any related signs or symptoms. PMH/PSH/Allergies/Social History/medication list and most recent studies reviewed with patient. Tobacco use: No  Alcohol use: Social  She is retired and living with her . Reports compliance with medications and diet. Reports being active physically. Has been working in her garden regular basis. Reports no other new c/o. HPI    Review of Systems   Constitutional: Negative. HENT: Negative. Eyes: Negative. Respiratory: Negative. Negative for shortness of breath. Cardiovascular: Negative. Negative for chest pain and leg swelling. Gastrointestinal: Positive for constipation. Negative for abdominal pain, blood in stool and melena. Genitourinary: Negative. Musculoskeletal: Positive for joint pain. Negative for falls. Skin: Negative. Neurological: Positive for dizziness (Occasional postural dizziness). Endo/Heme/Allergies: Negative. Psychiatric/Behavioral: Negative. Physical Exam  Vitals and nursing note reviewed. Constitutional:       General: She is not in acute distress. Appearance: She is well-developed. She is obese. Comments: Pleasant  lady   HENT:      Head: Normocephalic and atraumatic. Nose: Nose normal.      Mouth/Throat:      Mouth: Mucous membranes are moist.   Eyes:      General: No scleral icterus. Conjunctiva/sclera: Conjunctivae normal.   Neck:      Thyroid: No thyromegaly. Vascular: No JVD. Cardiovascular:      Rate and Rhythm: Normal rate and regular rhythm. Heart sounds: Normal heart sounds. No murmur heard. Pulmonary:      Effort: Pulmonary effort is normal. No respiratory distress. Breath sounds: Normal breath sounds. No wheezing or rales. Abdominal:      General: Bowel sounds are normal. There is no distension. Palpations: Abdomen is soft. Tenderness: There is no abdominal tenderness. There is no right CVA tenderness or left CVA tenderness. Musculoskeletal:         General: No tenderness, deformity or signs of injury. Cervical back: Normal range of motion and neck supple. Right lower leg: No edema. Left lower leg: No edema. Skin:     General: Skin is warm and dry. Findings: No rash. Neurological:      General: No focal deficit present. Mental Status: She is alert and oriented to person, place, and time. Mental status is at baseline. Sensory: No sensory deficit. Motor: No weakness. Coordination: Coordination normal.      Gait: Gait normal.   Psychiatric:         Behavior: Behavior normal.         ASSESSMENT and PLAN  Diagnoses and all orders for this visit:    1. Pure hypercholesterolemia  -     LIPID PANEL; Future  Continue Zetia  Has statin intolerance  2. Acquired hypothyroidism  -     TSH 3RD GENERATION; Future  Stable. Continue levothyroxine  3. Essential hypertension  Monitor BP at home with goal of 140/90 or less   Stable chronic condition. Continue current treatment/medications. 4. Chronic constipation  Stable chronic condition. Continue current treatment/medications. Continue Linzess as needed  5. Paresthesia of foot, bilateral  Foot and leg stretches/exercises discussed  Try tonic water with quinine and magnesium supplements  May benefit from gabapentin if no better    Follow-up and Dispositions    · Return in about 6 months (around 12/28/2022).      All chronic medical problems are stable  Continue with current medical management and plan  lab results and schedule of future lab studies reviewed with patient  reviewed diet, exercise and weight control  reviewed medications and side effects in detail  F/u with other MD's/ providers as scheduled  COVID-19 precautions discussed with pt  An After Visit Summary was printed and given to the patient.

## 2022-06-28 NOTE — PROGRESS NOTES
Rm    Chief Complaint   Patient presents with    Hypertension     6 mo f/u    Thyroid Problem     6 mo f/u    Foot Pain     tingling        Visit Vitals  /86 (BP 1 Location: Left upper arm, BP Patient Position: Sitting, BP Cuff Size: Adult)   Pulse 78   Temp 99.2 °F (37.3 °C) (Oral)   Resp 20   Ht 5' 3\" (1.6 m)   Wt 170 lb 12.8 oz (77.5 kg)   SpO2 99%   BMI 30.26 kg/m²        1. Have you been to the ER, urgent care clinic since your last visit? Hospitalized since your last visit? No    2. Have you seen or consulted any other health care providers outside of the [address] [place] [address] since your last visit? Include any pap smears or colon screening. No     Health Maintenance Due   Topic Date Due    DTaP/Tdap/Td series (1 - Tdap) Never done    Shingrix Vaccine Age 50> (1 of 2) Never done        3 most recent PHQ Screens 6/28/2022   Little interest or pleasure in doing things Not at all   Feeling down, depressed, irritable, or hopeless Not at all   Total Score PHQ 2 0        Fall Risk Assessment, last 12 mths 8/11/2021   Able to walk? Yes   Fall in past 12 months? 0   Do you feel unsteady?  0   Are you worried about falling 0       Learning Assessment 5/7/2019   PRIMARY LEARNER Patient   CO-LEARNER CAREGIVER No   PRIMARY LANGUAGE ENGLISH   LEARNER PREFERENCE PRIMARY VIDEOS   ANSWERED BY Patient   RELATIONSHIP SELF

## 2022-06-29 LAB
CHOLEST SERPL-MCNC: 239 MG/DL (ref 100–199)
HDLC SERPL-MCNC: 71 MG/DL
IMP & REVIEW OF LAB RESULTS: NORMAL
LDLC SERPL CALC-MCNC: 153 MG/DL (ref 0–99)
TRIGL SERPL-MCNC: 88 MG/DL (ref 0–149)
TSH SERPL DL<=0.005 MIU/L-ACNC: 1 UIU/ML (ref 0.45–4.5)
VLDLC SERPL CALC-MCNC: 15 MG/DL (ref 5–40)

## 2022-07-01 NOTE — PROGRESS NOTES
Cholesterol is higher than last time---- watch fatty food/exercise  Continue Zetia.     Thyroid numbers normal

## 2022-11-28 ENCOUNTER — TRANSCRIBE ORDER (OUTPATIENT)
Dept: SCHEDULING | Age: 62
End: 2022-11-28

## 2022-11-28 DIAGNOSIS — Z12.31 OTHER SCREENING MAMMOGRAM: Primary | ICD-10-CM

## 2022-11-29 DIAGNOSIS — E78.00 PURE HYPERCHOLESTEROLEMIA: ICD-10-CM

## 2022-11-29 RX ORDER — LOSARTAN POTASSIUM AND HYDROCHLOROTHIAZIDE 25; 100 MG/1; MG/1
TABLET ORAL
Qty: 90 TABLET | Refills: 1 | Status: SHIPPED | OUTPATIENT
Start: 2022-11-29

## 2022-11-29 RX ORDER — EZETIMIBE 10 MG/1
TABLET ORAL
Qty: 90 TABLET | Refills: 1 | Status: SHIPPED | OUTPATIENT
Start: 2022-11-29

## 2022-12-27 ENCOUNTER — OFFICE VISIT (OUTPATIENT)
Dept: INTERNAL MEDICINE CLINIC | Age: 62
End: 2022-12-27
Payer: COMMERCIAL

## 2022-12-27 VITALS
HEART RATE: 68 BPM | TEMPERATURE: 98.4 F | SYSTOLIC BLOOD PRESSURE: 126 MMHG | BODY MASS INDEX: 29.55 KG/M2 | OXYGEN SATURATION: 99 % | DIASTOLIC BLOOD PRESSURE: 83 MMHG | HEIGHT: 63 IN | WEIGHT: 166.8 LBS | RESPIRATION RATE: 16 BRPM

## 2022-12-27 DIAGNOSIS — K59.09 CHRONIC CONSTIPATION: ICD-10-CM

## 2022-12-27 DIAGNOSIS — I10 ESSENTIAL HYPERTENSION: Primary | ICD-10-CM

## 2022-12-27 DIAGNOSIS — E78.00 PURE HYPERCHOLESTEROLEMIA: ICD-10-CM

## 2022-12-27 DIAGNOSIS — E03.9 ACQUIRED HYPOTHYROIDISM: ICD-10-CM

## 2022-12-27 PROCEDURE — 90686 IIV4 VACC NO PRSV 0.5 ML IM: CPT | Performed by: INTERNAL MEDICINE

## 2022-12-27 PROCEDURE — 90471 IMMUNIZATION ADMIN: CPT | Performed by: INTERNAL MEDICINE

## 2022-12-27 PROCEDURE — 99214 OFFICE O/P EST MOD 30 MIN: CPT | Performed by: INTERNAL MEDICINE

## 2022-12-27 RX ORDER — CLINDAMYCIN PHOSPHATE 10 UG/ML
LOTION TOPICAL
COMMUNITY
Start: 2022-10-21

## 2022-12-27 RX ORDER — TRETINOIN 0.5 MG/G
CREAM TOPICAL
COMMUNITY
Start: 2022-10-21

## 2022-12-27 NOTE — PROGRESS NOTES
Rm    Chief Complaint   Patient presents with    Cholesterol Problem     Follow up        Visit Vitals  /83 (BP 1 Location: Right upper arm, BP Patient Position: Sitting, BP Cuff Size: Adult)   Pulse 68   Temp 98.4 °F (36.9 °C) (Oral)   Resp 16   Ht 5' 3\" (1.6 m)   Wt 166 lb 12.8 oz (75.7 kg)   SpO2 99%   BMI 29.55 kg/m²        1. Have you been to the ER, urgent care clinic since your last visit? Hospitalized since your last visit? No    2. Have you seen or consulted any other health care providers outside of the [address] [place] [address] since your last visit? Include any pap smears or colon screening. No     Health Maintenance Due   Topic Date Due    DTaP/Tdap/Td series (1 - Tdap) Never done    Shingles Vaccine (1 of 2) Never done    COVID-19 Vaccine (3 - Booster for Ulices series) 01/26/2022    Flu Vaccine (1) 08/01/2022        3 most recent PHQ Screens 12/27/2022   Little interest or pleasure in doing things Not at all   Feeling down, depressed, irritable, or hopeless Not at all   Total Score PHQ 2 0        Fall Risk Assessment, last 12 mths 8/11/2021   Able to walk? Yes   Fall in past 12 months? 0   Do you feel unsteady?  0   Are you worried about falling 0       Learning Assessment 5/7/2019   PRIMARY LEARNER Patient   CO-LEARNER CAREGIVER No   PRIMARY LANGUAGE ENGLISH   LEARNER PREFERENCE PRIMARY VIDEOS   ANSWERED BY Patient   RELATIONSHIP SELF

## 2022-12-27 NOTE — PROGRESS NOTES
Gloria Owen is a 58 y.o. female. Pt. comes in for f/u. Has a few chronic medical issues as documented. Overall has been feeling okay. Vital signs are stable. Cholesterol remains high despite taking Zetia. HDL is high. Has lost some weight. BMI is 29.55. All chronic medical issues are stable on current treatment regimen. Denies any issues with  Covid-19 vaccination. PMH/PSH/Allergies/Social History/medication list and most recent studies reviewed with patient. Tobacco use: No  Alcohol use: Social  Reports compliance with medications and diet. Trying to be active physically to control weight. Reports no other new c/o. Past Medical History:   Diagnosis Date    Fibroids     Hypercholesterolemia     Hypertension     Hypothyroid     Inguinal hernia bilateral, non-recurrent     Menopause     Pregnancy     x 3 all vaginal births       No Known Allergies    Current Outpatient Medications on File Prior to Visit   Medication Sig Dispense Refill    losartan-hydroCHLOROthiazide (HYZAAR) 100-25 mg per tablet TAKE 1 TABLET DAILY 90 Tablet 1    ezetimibe (ZETIA) 10 mg tablet TAKE 1 TABLET DAILY 90 Tablet 1    levothyroxine (SYNTHROID) 200 mcg tablet TAKE 1 TABLET DAILY BEFORE BREAKFAST 90 Tablet 1    linaCLOtide (Linzess) 145 mcg cap capsule TAKE 1 CAPSULE TWICE A DAY ON MONDAY, WEDNESDAY AND FRIDAY AS NEEDED 90 Capsule 3    multivit,iron,minerals/lutein (CENTRUM SILVER ULTRA WOMEN'S PO) Take 1 Tab by mouth daily. ascorbic acid, vitamin C, (VITAMIN C) 500 mg tablet Take 1,000 mg by mouth as needed. clindamycin (CLEOCIN T) 1 % lotion       tretinoin (RETIN-A) 0.05 % topical cream        No current facility-administered medications on file prior to visit.        Visit Vitals  Blood Pressure 126/83 (BP 1 Location: Right upper arm, BP Patient Position: Sitting, BP Cuff Size: Adult)   Pulse 68   Temperature 98.4 °F (36.9 °C) (Oral)   Respiration 16   Height 5' 3\" (1.6 m)   Weight 166 lb 12.8 oz (75.7 kg)   Oxygen Saturation 99%   Body Mass Index 29.55 kg/m²             HPI  Review of Systems   Constitutional: Negative. HENT: Negative. Eyes: Negative. Respiratory: Negative. Negative for shortness of breath. Cardiovascular: Negative. Negative for chest pain and leg swelling. Gastrointestinal:  Positive for constipation. Negative for abdominal pain, blood in stool and melena. Genitourinary: Negative. Musculoskeletal:  Positive for joint pain. Negative for falls. Skin: Negative. Neurological: Negative. Negative for dizziness, sensory change, focal weakness and headaches. Endo/Heme/Allergies: Negative. Psychiatric/Behavioral: Negative. Objective  Physical Exam  Vitals and nursing note reviewed. Constitutional:       General: She is not in acute distress. Appearance: She is well-developed. Comments: Pleasant  lady   HENT:      Head: Normocephalic and atraumatic. Nose: Nose normal.      Mouth/Throat:      Mouth: Mucous membranes are moist.   Eyes:      General: No scleral icterus. Conjunctiva/sclera: Conjunctivae normal.   Neck:      Thyroid: No thyromegaly. Vascular: No JVD. Cardiovascular:      Rate and Rhythm: Normal rate and regular rhythm. Heart sounds: Normal heart sounds. No murmur heard. Pulmonary:      Effort: Pulmonary effort is normal. No respiratory distress. Breath sounds: Normal breath sounds. No wheezing or rales. Abdominal:      General: Bowel sounds are normal. There is no distension. Palpations: Abdomen is soft. Tenderness: There is no abdominal tenderness. There is no right CVA tenderness or left CVA tenderness. Musculoskeletal:         General: No tenderness, deformity or signs of injury. Cervical back: Normal range of motion and neck supple. Right lower leg: No edema. Left lower leg: No edema. Skin:     General: Skin is warm and dry. Findings: No rash.    Neurological: General: No focal deficit present. Mental Status: She is alert and oriented to person, place, and time. Mental status is at baseline. Sensory: No sensory deficit. Motor: No weakness. Coordination: Coordination normal.      Gait: Gait normal.   Psychiatric:         Behavior: Behavior normal.        Assessment & Plan    ICD-10-CM ICD-9-CM    1. Essential hypertension  I10 401.9 Monitor BP at home with goal of 140/90 or less   Stable chronic condition. Continue current treatment/medications. 2. Acquired hypothyroidism  E03.9 244.9 TSH 3RD GENERATION      3. Pure hypercholesterolemia  E78.00 272.0 LIPID PANEL      METABOLIC PANEL, COMPREHENSIVE      CBC W/O DIFF        4. Chronic constipation  K59.09 564.00 Stable. Use Linzess as needed. Orders Placed This Encounter    LIPID PANEL     Standing Status:   Future     Standing Expiration Date:   05/18/1785    METABOLIC PANEL, COMPREHENSIVE     Standing Status:   Future     Standing Expiration Date:   12/27/2023    CBC W/O DIFF     Standing Status:   Future     Standing Expiration Date:   12/27/2023    TSH 3RD GENERATION     Standing Status:   Future     Standing Expiration Date:   12/27/2023     Follow-up and Dispositions    Return in about 6 months (around 6/27/2023). All chronic medical problems are stable  Continue with current medical management and plan  lab results and schedule of future lab studies reviewed with patient  reviewed diet, exercise and weight control  reviewed medications and side effects in detail  F/u with other MD's/ providers as scheduled  COVID-19 precautions discussed with pt  An After Visit Summary was printed and given to the patient.     Manav Lerma DO

## 2022-12-28 ENCOUNTER — HOSPITAL ENCOUNTER (OUTPATIENT)
Dept: MAMMOGRAPHY | Age: 62
Discharge: HOME | End: 2022-12-28
Attending: INTERNAL MEDICINE
Payer: COMMERCIAL

## 2022-12-28 DIAGNOSIS — Z12.31 OTHER SCREENING MAMMOGRAM: ICD-10-CM

## 2022-12-28 LAB
ALBUMIN SERPL-MCNC: 4.8 G/DL (ref 3.8–4.8)
ALBUMIN/GLOB SERPL: 1.7 {RATIO} (ref 1.2–2.2)
ALP SERPL-CCNC: 64 IU/L (ref 44–121)
ALT SERPL-CCNC: 18 IU/L (ref 0–32)
AST SERPL-CCNC: 24 IU/L (ref 0–40)
BILIRUB SERPL-MCNC: 0.3 MG/DL (ref 0–1.2)
BUN SERPL-MCNC: 13 MG/DL (ref 8–27)
BUN/CREAT SERPL: 13 (ref 12–28)
CALCIUM SERPL-MCNC: 10 MG/DL (ref 8.7–10.3)
CHLORIDE SERPL-SCNC: 101 MMOL/L (ref 96–106)
CHOLEST SERPL-MCNC: 240 MG/DL (ref 100–199)
CO2 SERPL-SCNC: 28 MMOL/L (ref 20–29)
CREAT SERPL-MCNC: 0.99 MG/DL (ref 0.57–1)
EGFR: 64 ML/MIN/1.73
ERYTHROCYTE [DISTWIDTH] IN BLOOD BY AUTOMATED COUNT: 12.5 % (ref 11.7–15.4)
GLOBULIN SER CALC-MCNC: 2.8 G/DL (ref 1.5–4.5)
GLUCOSE SERPL-MCNC: 80 MG/DL (ref 70–99)
HCT VFR BLD AUTO: 42.3 % (ref 34–46.6)
HDLC SERPL-MCNC: 68 MG/DL
HGB BLD-MCNC: 13.3 G/DL (ref 11.1–15.9)
IMP & REVIEW OF LAB RESULTS: NORMAL
LDLC SERPL CALC-MCNC: 157 MG/DL (ref 0–99)
MCH RBC QN AUTO: 25.5 PG (ref 26.6–33)
MCHC RBC AUTO-ENTMCNC: 31.4 G/DL (ref 31.5–35.7)
MCV RBC AUTO: 81 FL (ref 79–97)
PLATELET # BLD AUTO: 273 X10E3/UL (ref 150–450)
POTASSIUM SERPL-SCNC: 4.1 MMOL/L (ref 3.5–5.2)
PROT SERPL-MCNC: 7.6 G/DL (ref 6–8.5)
RBC # BLD AUTO: 5.22 X10E6/UL (ref 3.77–5.28)
SODIUM SERPL-SCNC: 141 MMOL/L (ref 134–144)
TRIGL SERPL-MCNC: 85 MG/DL (ref 0–149)
TSH SERPL DL<=0.005 MIU/L-ACNC: 0.81 UIU/ML (ref 0.45–4.5)
VLDLC SERPL CALC-MCNC: 15 MG/DL (ref 5–40)
WBC # BLD AUTO: 4.6 X10E3/UL (ref 3.4–10.8)

## 2022-12-28 PROCEDURE — 77063 BREAST TOMOSYNTHESIS BI: CPT

## 2022-12-29 ENCOUNTER — TELEPHONE (OUTPATIENT)
Dept: INTERNAL MEDICINE CLINIC | Age: 62
End: 2022-12-29

## 2022-12-29 NOTE — TELEPHONE ENCOUNTER
Lab results cholesterol 240 total  Ldl high 157  Pt states she discussed cholesterol medication with Dr Murtaza Henry. She wants to start a statin as soon as possible.  Pls notify pt which med  wants her to take

## 2022-12-30 RX ORDER — ATORVASTATIN CALCIUM 10 MG/1
10 TABLET, FILM COATED ORAL DAILY
Qty: 90 TABLET | Refills: 1 | Status: SHIPPED | OUTPATIENT
Start: 2022-12-30

## 2022-12-30 NOTE — TELEPHONE ENCOUNTER
Coy Klein, DO  You 4 minutes ago (12:58 PM)     IM  I sent Lipitor 10 mg in. Stop Zetia. Recheck lipids, ALT, AST in 6 to 8 weeks.         Pulse Message sent

## 2023-03-31 LAB
ALBUMIN SERPL-MCNC: 4.5 G/DL (ref 3.8–4.8)
ALBUMIN/GLOB SERPL: 1.5 {RATIO} (ref 1.2–2.2)
ALP SERPL-CCNC: 65 IU/L (ref 44–121)
ALT SERPL-CCNC: 15 IU/L (ref 0–32)
AST SERPL-CCNC: 19 IU/L (ref 0–40)
BILIRUB SERPL-MCNC: 0.4 MG/DL (ref 0–1.2)
BUN SERPL-MCNC: 13 MG/DL (ref 8–27)
BUN/CREAT SERPL: 15 (ref 12–28)
CALCIUM SERPL-MCNC: 9.9 MG/DL (ref 8.7–10.3)
CHLORIDE SERPL-SCNC: 102 MMOL/L (ref 96–106)
CHOLEST SERPL-MCNC: 207 MG/DL (ref 100–199)
CO2 SERPL-SCNC: 26 MMOL/L (ref 20–29)
CREAT SERPL-MCNC: 0.85 MG/DL (ref 0.57–1)
EGFRCR SERPLBLD CKD-EPI 2021: 77 ML/MIN/1.73
ERYTHROCYTE [DISTWIDTH] IN BLOOD BY AUTOMATED COUNT: 12.8 % (ref 11.7–15.4)
GLOBULIN SER CALC-MCNC: 3 G/DL (ref 1.5–4.5)
GLUCOSE SERPL-MCNC: 82 MG/DL (ref 70–99)
HCT VFR BLD AUTO: 38 % (ref 34–46.6)
HDLC SERPL-MCNC: 65 MG/DL
HGB BLD-MCNC: 12.1 G/DL (ref 11.1–15.9)
IMP & REVIEW OF LAB RESULTS: NORMAL
LDLC SERPL CALC-MCNC: 131 MG/DL (ref 0–99)
MCH RBC QN AUTO: 26.3 PG (ref 26.6–33)
MCHC RBC AUTO-ENTMCNC: 31.8 G/DL (ref 31.5–35.7)
MCV RBC AUTO: 83 FL (ref 79–97)
PLATELET # BLD AUTO: 268 X10E3/UL (ref 150–450)
POTASSIUM SERPL-SCNC: 4.7 MMOL/L (ref 3.5–5.2)
PROT SERPL-MCNC: 7.5 G/DL (ref 6–8.5)
RBC # BLD AUTO: 4.6 X10E6/UL (ref 3.77–5.28)
SODIUM SERPL-SCNC: 142 MMOL/L (ref 134–144)
TRIGL SERPL-MCNC: 62 MG/DL (ref 0–149)
TSH SERPL DL<=0.005 MIU/L-ACNC: 0.77 UIU/ML (ref 0.45–4.5)
VLDLC SERPL CALC-MCNC: 11 MG/DL (ref 5–40)
WBC # BLD AUTO: 4.9 X10E3/UL (ref 3.4–10.8)

## 2023-05-18 DIAGNOSIS — E03.9 ACQUIRED HYPOTHYROIDISM: Primary | ICD-10-CM

## 2023-05-18 RX ORDER — LEVOTHYROXINE SODIUM 0.2 MG/1
200 TABLET ORAL DAILY
Qty: 90 TABLET | Refills: 0 | Status: SHIPPED | OUTPATIENT
Start: 2023-05-18

## 2023-05-24 RX ORDER — LOSARTAN POTASSIUM AND HYDROCHLOROTHIAZIDE 25; 100 MG/1; MG/1
1 TABLET ORAL DAILY
COMMUNITY
Start: 2022-11-29 | End: 2023-06-27 | Stop reason: SDUPTHER

## 2023-05-24 RX ORDER — ATORVASTATIN CALCIUM 10 MG/1
10 TABLET, FILM COATED ORAL DAILY
COMMUNITY
Start: 2022-12-30

## 2023-05-24 RX ORDER — LEVOTHYROXINE SODIUM 0.2 MG/1
TABLET ORAL
COMMUNITY
Start: 2022-10-20

## 2023-05-24 RX ORDER — CLINDAMYCIN PHOSPHATE 10 UG/ML
LOTION TOPICAL
COMMUNITY
Start: 2022-10-21

## 2023-05-24 RX ORDER — TRETINOIN 0.5 MG/G
CREAM TOPICAL
COMMUNITY
Start: 2022-10-21

## 2023-05-24 RX ORDER — ASCORBIC ACID 500 MG
1000 TABLET ORAL PRN
COMMUNITY

## 2023-06-27 ENCOUNTER — OFFICE VISIT (OUTPATIENT)
Age: 63
End: 2023-06-27
Payer: OTHER GOVERNMENT

## 2023-06-27 VITALS
HEIGHT: 63 IN | OXYGEN SATURATION: 99 % | SYSTOLIC BLOOD PRESSURE: 132 MMHG | RESPIRATION RATE: 16 BRPM | DIASTOLIC BLOOD PRESSURE: 74 MMHG | BODY MASS INDEX: 29.77 KG/M2 | HEART RATE: 65 BPM | WEIGHT: 168 LBS | TEMPERATURE: 98 F

## 2023-06-27 DIAGNOSIS — K59.09 CHRONIC CONSTIPATION: ICD-10-CM

## 2023-06-27 DIAGNOSIS — M54.2 MYOFASCIAL NECK PAIN: ICD-10-CM

## 2023-06-27 DIAGNOSIS — E03.9 ACQUIRED HYPOTHYROIDISM: ICD-10-CM

## 2023-06-27 DIAGNOSIS — E78.00 PURE HYPERCHOLESTEROLEMIA: ICD-10-CM

## 2023-06-27 DIAGNOSIS — I10 ESSENTIAL HYPERTENSION: ICD-10-CM

## 2023-06-27 DIAGNOSIS — I10 ESSENTIAL HYPERTENSION: Primary | ICD-10-CM

## 2023-06-27 PROCEDURE — 3074F SYST BP LT 130 MM HG: CPT | Performed by: INTERNAL MEDICINE

## 2023-06-27 PROCEDURE — 99214 OFFICE O/P EST MOD 30 MIN: CPT | Performed by: INTERNAL MEDICINE

## 2023-06-27 PROCEDURE — 3078F DIAST BP <80 MM HG: CPT | Performed by: INTERNAL MEDICINE

## 2023-06-27 RX ORDER — LOSARTAN POTASSIUM AND HYDROCHLOROTHIAZIDE 25; 100 MG/1; MG/1
1 TABLET ORAL DAILY
Qty: 90 TABLET | Refills: 1 | Status: SHIPPED | OUTPATIENT
Start: 2023-06-27

## 2023-06-27 SDOH — ECONOMIC STABILITY: HOUSING INSECURITY
IN THE LAST 12 MONTHS, WAS THERE A TIME WHEN YOU DID NOT HAVE A STEADY PLACE TO SLEEP OR SLEPT IN A SHELTER (INCLUDING NOW)?: NO

## 2023-06-27 SDOH — ECONOMIC STABILITY: FOOD INSECURITY: WITHIN THE PAST 12 MONTHS, YOU WORRIED THAT YOUR FOOD WOULD RUN OUT BEFORE YOU GOT MONEY TO BUY MORE.: NEVER TRUE

## 2023-06-27 SDOH — ECONOMIC STABILITY: FOOD INSECURITY: WITHIN THE PAST 12 MONTHS, THE FOOD YOU BOUGHT JUST DIDN'T LAST AND YOU DIDN'T HAVE MONEY TO GET MORE.: NEVER TRUE

## 2023-06-27 SDOH — ECONOMIC STABILITY: INCOME INSECURITY: HOW HARD IS IT FOR YOU TO PAY FOR THE VERY BASICS LIKE FOOD, HOUSING, MEDICAL CARE, AND HEATING?: NOT VERY HARD

## 2023-06-27 ASSESSMENT — PATIENT HEALTH QUESTIONNAIRE - PHQ9
2. FEELING DOWN, DEPRESSED OR HOPELESS: 0
1. LITTLE INTEREST OR PLEASURE IN DOING THINGS: 0
SUM OF ALL RESPONSES TO PHQ QUESTIONS 1-9: 0
SUM OF ALL RESPONSES TO PHQ9 QUESTIONS 1 & 2: 0

## 2023-06-27 ASSESSMENT — ANXIETY QUESTIONNAIRES
2. NOT BEING ABLE TO STOP OR CONTROL WORRYING: 0
6. BECOMING EASILY ANNOYED OR IRRITABLE: 0
7. FEELING AFRAID AS IF SOMETHING AWFUL MIGHT HAPPEN: 0
IF YOU CHECKED OFF ANY PROBLEMS ON THIS QUESTIONNAIRE, HOW DIFFICULT HAVE THESE PROBLEMS MADE IT FOR YOU TO DO YOUR WORK, TAKE CARE OF THINGS AT HOME, OR GET ALONG WITH OTHER PEOPLE: NOT DIFFICULT AT ALL
4. TROUBLE RELAXING: 0
1. FEELING NERVOUS, ANXIOUS, OR ON EDGE: 0
5. BEING SO RESTLESS THAT IT IS HARD TO SIT STILL: 0
3. WORRYING TOO MUCH ABOUT DIFFERENT THINGS: 0
GAD7 TOTAL SCORE: 0

## 2023-06-30 ASSESSMENT — ENCOUNTER SYMPTOMS
SHORTNESS OF BREATH: 0
EYES NEGATIVE: 1
BACK PAIN: 1
CONSTIPATION: 1
ALLERGIC/IMMUNOLOGIC NEGATIVE: 1
RESPIRATORY NEGATIVE: 1
ABDOMINAL PAIN: 0

## 2023-07-31 RX ORDER — ATORVASTATIN CALCIUM 10 MG/1
TABLET, FILM COATED ORAL
Qty: 90 TABLET | Refills: 3 | Status: SHIPPED | OUTPATIENT
Start: 2023-07-31

## 2023-10-03 DIAGNOSIS — E03.9 ACQUIRED HYPOTHYROIDISM: ICD-10-CM

## 2023-10-03 RX ORDER — LEVOTHYROXINE SODIUM 0.2 MG/1
200 TABLET ORAL DAILY
Qty: 90 TABLET | Refills: 3 | Status: SHIPPED | OUTPATIENT
Start: 2023-10-03

## 2023-11-16 LAB
ERYTHROCYTE [DISTWIDTH] IN BLOOD BY AUTOMATED COUNT: 13 % (ref 11.7–15.4)
HCT VFR BLD AUTO: 38.4 % (ref 34–46.6)
HGB BLD-MCNC: 12.2 G/DL (ref 11.1–15.9)
MCH RBC QN AUTO: 26.2 PG (ref 26.6–33)
MCHC RBC AUTO-ENTMCNC: 31.8 G/DL (ref 31.5–35.7)
MCV RBC AUTO: 82 FL (ref 79–97)
PLATELET # BLD AUTO: 261 X10E3/UL (ref 150–450)
RBC # BLD AUTO: 4.66 X10E6/UL (ref 3.77–5.28)
WBC # BLD AUTO: 5 X10E3/UL (ref 3.4–10.8)

## 2023-11-17 LAB
ALBUMIN SERPL-MCNC: 4.4 G/DL (ref 3.9–4.9)
ALBUMIN/GLOB SERPL: 1.4 {RATIO} (ref 1.2–2.2)
ALP SERPL-CCNC: 68 IU/L (ref 44–121)
ALT SERPL-CCNC: 17 IU/L (ref 0–32)
AST SERPL-CCNC: 21 IU/L (ref 0–40)
BILIRUB SERPL-MCNC: 0.4 MG/DL (ref 0–1.2)
BUN SERPL-MCNC: 13 MG/DL (ref 8–27)
BUN/CREAT SERPL: 16 (ref 12–28)
CALCIUM SERPL-MCNC: 9.7 MG/DL (ref 8.7–10.3)
CHLORIDE SERPL-SCNC: 104 MMOL/L (ref 96–106)
CHOLEST SERPL-MCNC: 202 MG/DL (ref 100–199)
CO2 SERPL-SCNC: 26 MMOL/L (ref 20–29)
CREAT SERPL-MCNC: 0.79 MG/DL (ref 0.57–1)
EGFRCR SERPLBLD CKD-EPI 2021: 84 ML/MIN/1.73
GLOBULIN SER CALC-MCNC: 3.2 G/DL (ref 1.5–4.5)
GLUCOSE SERPL-MCNC: 77 MG/DL (ref 70–99)
HDLC SERPL-MCNC: 68 MG/DL
IMP & REVIEW OF LAB RESULTS: NORMAL
LDLC SERPL CALC-MCNC: 121 MG/DL (ref 0–99)
POTASSIUM SERPL-SCNC: 3.9 MMOL/L (ref 3.5–5.2)
PROT SERPL-MCNC: 7.6 G/DL (ref 6–8.5)
SODIUM SERPL-SCNC: 146 MMOL/L (ref 134–144)
TRIGL SERPL-MCNC: 70 MG/DL (ref 0–149)
TSH SERPL DL<=0.005 MIU/L-ACNC: 0.11 UIU/ML (ref 0.45–4.5)
VLDLC SERPL CALC-MCNC: 13 MG/DL (ref 5–40)

## 2023-11-28 DIAGNOSIS — K59.09 CHRONIC CONSTIPATION: Primary | ICD-10-CM

## 2023-12-13 ENCOUNTER — OFFICE VISIT (OUTPATIENT)
Age: 63
End: 2023-12-13
Payer: OTHER GOVERNMENT

## 2023-12-13 VITALS
OXYGEN SATURATION: 99 % | DIASTOLIC BLOOD PRESSURE: 82 MMHG | BODY MASS INDEX: 30.65 KG/M2 | TEMPERATURE: 98 F | HEART RATE: 64 BPM | WEIGHT: 173 LBS | RESPIRATION RATE: 16 BRPM | HEIGHT: 63 IN | SYSTOLIC BLOOD PRESSURE: 122 MMHG

## 2023-12-13 DIAGNOSIS — K59.09 CHRONIC CONSTIPATION: ICD-10-CM

## 2023-12-13 DIAGNOSIS — E89.0 POSTABLATIVE HYPOTHYROIDISM: ICD-10-CM

## 2023-12-13 DIAGNOSIS — E78.00 PURE HYPERCHOLESTEROLEMIA: ICD-10-CM

## 2023-12-13 DIAGNOSIS — I10 ESSENTIAL (PRIMARY) HYPERTENSION: ICD-10-CM

## 2023-12-13 DIAGNOSIS — Z23 FLU VACCINE NEED: ICD-10-CM

## 2023-12-13 DIAGNOSIS — E89.0 POSTABLATIVE HYPOTHYROIDISM: Primary | ICD-10-CM

## 2023-12-13 PROCEDURE — 99214 OFFICE O/P EST MOD 30 MIN: CPT | Performed by: INTERNAL MEDICINE

## 2023-12-13 PROCEDURE — 3074F SYST BP LT 130 MM HG: CPT | Performed by: INTERNAL MEDICINE

## 2023-12-13 PROCEDURE — 3079F DIAST BP 80-89 MM HG: CPT | Performed by: INTERNAL MEDICINE

## 2023-12-13 PROCEDURE — 90471 IMMUNIZATION ADMIN: CPT | Performed by: INTERNAL MEDICINE

## 2023-12-13 PROCEDURE — 90686 IIV4 VACC NO PRSV 0.5 ML IM: CPT | Performed by: INTERNAL MEDICINE

## 2023-12-13 NOTE — PROGRESS NOTES
1. \"Have you been to the ER, urgent care clinic since your last visit? Hospitalized since your last visit? \" No    2. \"Have you seen or consulted any other health care providers outside of the [address] [place] [address] since your last visit? \" No    3. For patients aged 43-73: Has the patient had a colonoscopy / FIT/ Cologuard? Recommendation: Colonoscopy every 10y or annual FIT test from 50-75 or every 3 year stool DNA based test with consideration of ongoing screening from 76-85. If the patient is female:    4. For patients aged 43-66: Has the patient had a mammogram within the past 2 years? No    5. For patients aged 21-65: Has the patient had a pap smear?  No

## 2023-12-29 ENCOUNTER — HOSPITAL ENCOUNTER (OUTPATIENT)
Facility: HOSPITAL | Age: 63
Discharge: HOME | End: 2023-12-29
Attending: INTERNAL MEDICINE
Payer: OTHER GOVERNMENT

## 2023-12-29 VITALS — HEIGHT: 63 IN | BODY MASS INDEX: 30.12 KG/M2 | WEIGHT: 170 LBS

## 2023-12-29 DIAGNOSIS — Z12.31 VISIT FOR SCREENING MAMMOGRAM: ICD-10-CM

## 2023-12-29 PROCEDURE — 77063 BREAST TOMOSYNTHESIS BI: CPT

## 2024-01-23 DIAGNOSIS — I10 ESSENTIAL HYPERTENSION: ICD-10-CM

## 2024-01-23 RX ORDER — LOSARTAN POTASSIUM AND HYDROCHLOROTHIAZIDE 25; 100 MG/1; MG/1
1 TABLET ORAL DAILY
Qty: 90 TABLET | Refills: 1 | Status: SHIPPED | OUTPATIENT
Start: 2024-01-23

## 2024-01-24 ENCOUNTER — HOSPITAL ENCOUNTER (OUTPATIENT)
Facility: HOSPITAL | Age: 64
Discharge: HOME | End: 2024-01-27
Attending: INTERNAL MEDICINE

## 2024-01-24 DIAGNOSIS — E89.0 POSTABLATIVE HYPOTHYROIDISM: ICD-10-CM

## 2024-01-24 DIAGNOSIS — E78.00 PURE HYPERCHOLESTEROLEMIA: ICD-10-CM

## 2024-01-24 DIAGNOSIS — I10 ESSENTIAL (PRIMARY) HYPERTENSION: ICD-10-CM

## 2024-01-24 PROCEDURE — 75571 CT HRT W/O DYE W/CA TEST: CPT

## 2024-02-09 LAB
T4 FREE SERPL-MCNC: 1.69 NG/DL (ref 0.82–1.77)
TSH SERPL DL<=0.005 MIU/L-ACNC: 2.18 UIU/ML (ref 0.45–4.5)

## 2024-06-06 ENCOUNTER — OFFICE VISIT (OUTPATIENT)
Age: 64
End: 2024-06-06
Payer: OTHER GOVERNMENT

## 2024-06-06 VITALS — DIASTOLIC BLOOD PRESSURE: 84 MMHG | SYSTOLIC BLOOD PRESSURE: 134 MMHG | BODY MASS INDEX: 30.11 KG/M2 | WEIGHT: 170 LBS

## 2024-06-06 DIAGNOSIS — R10.2 PELVIC PAIN: ICD-10-CM

## 2024-06-06 DIAGNOSIS — D21.9 FIBROIDS: ICD-10-CM

## 2024-06-06 DIAGNOSIS — Z12.31 ENCOUNTER FOR SCREENING MAMMOGRAM FOR MALIGNANT NEOPLASM OF BREAST: Primary | ICD-10-CM

## 2024-06-06 LAB
ALBUMIN SERPL-MCNC: 4.7 G/DL (ref 3.9–4.9)
ALBUMIN/GLOB SERPL: 1.6 {RATIO} (ref 1.2–2.2)
ALP SERPL-CCNC: 63 IU/L (ref 44–121)
ALT SERPL-CCNC: 15 IU/L (ref 0–32)
AST SERPL-CCNC: 23 IU/L (ref 0–40)
BILIRUB SERPL-MCNC: 0.4 MG/DL (ref 0–1.2)
BUN SERPL-MCNC: 16 MG/DL (ref 8–27)
BUN/CREAT SERPL: 17 (ref 12–28)
CALCIUM SERPL-MCNC: 10.2 MG/DL (ref 8.7–10.3)
CHLORIDE SERPL-SCNC: 103 MMOL/L (ref 96–106)
CHOLEST SERPL-MCNC: 233 MG/DL (ref 100–199)
CO2 SERPL-SCNC: 27 MMOL/L (ref 20–29)
CREAT SERPL-MCNC: 0.95 MG/DL (ref 0.57–1)
EGFRCR SERPLBLD CKD-EPI 2021: 67 ML/MIN/1.73
ERYTHROCYTE [DISTWIDTH] IN BLOOD BY AUTOMATED COUNT: 13.2 % (ref 11.7–15.4)
GLOBULIN SER CALC-MCNC: 3 G/DL (ref 1.5–4.5)
GLUCOSE SERPL-MCNC: 85 MG/DL (ref 70–99)
HCT VFR BLD AUTO: 39.3 % (ref 34–46.6)
HDLC SERPL-MCNC: 75 MG/DL
HGB BLD-MCNC: 12.5 G/DL (ref 11.1–15.9)
IMP & REVIEW OF LAB RESULTS: NORMAL
LDLC SERPL CALC-MCNC: 146 MG/DL (ref 0–99)
MCH RBC QN AUTO: 26.5 PG (ref 26.6–33)
MCHC RBC AUTO-ENTMCNC: 31.8 G/DL (ref 31.5–35.7)
MCV RBC AUTO: 83 FL (ref 79–97)
PLATELET # BLD AUTO: 261 X10E3/UL (ref 150–450)
POTASSIUM SERPL-SCNC: 4.2 MMOL/L (ref 3.5–5.2)
PROT SERPL-MCNC: 7.7 G/DL (ref 6–8.5)
RBC # BLD AUTO: 4.71 X10E6/UL (ref 3.77–5.28)
SODIUM SERPL-SCNC: 145 MMOL/L (ref 134–144)
TRIGL SERPL-MCNC: 69 MG/DL (ref 0–149)
TSH SERPL DL<=0.005 MIU/L-ACNC: 12.8 UIU/ML (ref 0.45–4.5)
VLDLC SERPL CALC-MCNC: 12 MG/DL (ref 5–40)
WBC # BLD AUTO: 5.3 X10E3/UL (ref 3.4–10.8)

## 2024-06-06 PROCEDURE — 3079F DIAST BP 80-89 MM HG: CPT | Performed by: OBSTETRICS & GYNECOLOGY

## 2024-06-06 PROCEDURE — 99396 PREV VISIT EST AGE 40-64: CPT | Performed by: OBSTETRICS & GYNECOLOGY

## 2024-06-06 PROCEDURE — 3075F SYST BP GE 130 - 139MM HG: CPT | Performed by: OBSTETRICS & GYNECOLOGY

## 2024-06-06 NOTE — PROGRESS NOTES
Annual exam    Martha Ji is a 62 yo postmenopausal female  A0 presenting for annual exam.      Her main concerns today include: intermittent menstrual like cramping/bloating, thinks this is related to fibroids. She has reported similar symptoms at prior visits. She also has IBS w/ constipation - on Linzess prn.      Recently with bilateral numbness across the bottom of toes. Family h/o diabetes (mom), but pt has not been screened recently. Has upcoming appt with PCP next week.     H/o bilateral inguinal hernia repairs in ~2018.      Vaginal dryness - uses lubricants which help.      Moved to [facility] from [place] to live closer to her 2 daughters.     TV ULTRASOUND 21  THE UTERUS IS ANTEVERTED, NORMAL IN SIZE AND HETEROGENOUS IN ECHOGENICITY. THERE APPEARS TO  BE MULTIPLE FIBROIDS SEEN THOUGHOUT THE UTERUS, ML SUBMUCOSAL VS. INTRACAVITARY MEASURING 33  X 24 MM. RT LAT INTRAMURAL MEASURING 45 X 40 MM. POSTERIOR CALCIFIED SUBSEROSAL MEASURING 31 X  25 MM. ML SUBMUCOSAL VS. INTRACAVITARY MEASURING 11 X 9 MM.  THE ENDOMETRIUM MEASURES 26MM IN THICKNESS (but suspected intracavitary fibroid, and no PMB). NO MASSES OR ABNORMALITIES ARE SEEN.  RIGHT OVARY APPEARS WNL.  LEFT OVARY NOT SEEN DUE TO BOWEL GAS. LT ADN APPEARS WNL.  NO FREE FLUID IS SEEN IN THE CDS.     Ob/Gyn Hx:   A0 - 3   [id number]  VMS- denies  Vag dryness- yes, uses lubricants  STI- denies  ?SA-yes,      Health maintenance:  Pap- 23 NILM/HPV neg, 12/10/19 NILM HPV Neg  Mammo- 23 B1, 22 B1; 21 B1, 20 B1  Colonoscopy-  per pt, hx of polyps    Past Medical History:   Diagnosis Date    Fibroids     Hypercholesterolemia     Hypertension     Hypothyroid     Inguinal hernia bilateral, non-recurrent     Menopause     Pregnancy     x 3 all vaginal births       Past Surgical History:   Procedure Laterality Date    HERNIA REPAIR  2018    Bilateral inguinal    ORTHOPEDIC SURGERY      Left elbow surgery    
Chief Complaint   Patient presents with    Annual Exam     Martha Ji is a 62 y.o. postmenopausal female  A0 presenting for annual exam.  Her main concerns today include: intermittent menstrual like cramping    She reports bilateral numbness across the bottom of toes.  The patient states she is not diabetic but I do not see any recent testing for diabetes.  She does have family h/o diabetes, her mother.    From Dr. Swift's last note, 23: annual visit.  History of fibroids and associated pelvic bloating. Reports occasional cramping believes likely due to fibroids, but also on Linzess for IBS. Also with associated urinary frequency and constipation. H/o bilateral inguinal hernia repairs in ~2018.      +vaginal dryness - uses lubricants which help.      Moved to [facility] from [place] to live closer to her 2 daughters.     TV ULTRASOUND 21  THE UTERUS IS ANTEVERTED, NORMAL IN SIZE AND HETEROGENOUS IN ECHOGENICITY. THERE APPEARS TO  BE MULTIPLE FIBROIDS SEEN THOUGHOUT THE UTERUS, ML SUBMUCOSAL VS. INTRACAVITARY MEASURING 33  X 24 MM. RT LAT INTRAMURAL MEASURING 45 X 40 MM. POSTERIOR CALCIFIED SUBSEROSAL MEASURING 31 X  25 MM. ML SUBMUCOSAL VS. INTRACAVITARY MEASURING 11 X 9 MM.  THE ENDOMETRIUM MEASURES 26MM IN THICKNESS (but suspected intracavitary fibroid, and no PMB). NO MASSES OR ABNORMALITIES ARE SEEN.  RIGHT OVARY APPEARS WNL.  LEFT OVARY NOT SEEN DUE TO BOWEL GAS. LT ADN APPEARS WNL.  NO FREE FLUID IS SEEN IN THE CDS.     Ob/Gyn Hx:   A0 - 3   Josee-[id number]  VMS- denies  Vag dryness- yes, uses lubricants  STI- denies  ?SA-yes,      Health maintenance:  Pap- 23 NILM/HPV neg, 12/10/19 NILM HPV Neg  Mammo- 23 B1, 22 B1; 21 B1, 20 B1  Colonoscopy-  per pt, hx of polyps     1. Have you been to the ER, urgent care clinic, or hospitalized since your last visit?No    2. Have you seen or consulted any other health care providers outside of the [facility] 
no fever and no chills.

## 2024-06-10 ENCOUNTER — TELEPHONE (OUTPATIENT)
Age: 64
End: 2024-06-10

## 2024-06-10 NOTE — TELEPHONE ENCOUNTER
Pt wants to discuss recent labs  Pt having some anxiety  Pt worried about her her thyroid  Pt wants to know if her medication will be changed due to her recent lab results  Pt states blood sugar is 111- she is not diabetic and this was checked after drinking orange juice  Pt had tick bite Thursday- that area has swelled up a little

## 2024-06-12 ENCOUNTER — OFFICE VISIT (OUTPATIENT)
Age: 64
End: 2024-06-12
Payer: OTHER GOVERNMENT

## 2024-06-12 VITALS
BODY MASS INDEX: 29.45 KG/M2 | OXYGEN SATURATION: 98 % | SYSTOLIC BLOOD PRESSURE: 137 MMHG | WEIGHT: 166.2 LBS | HEART RATE: 67 BPM | HEIGHT: 63 IN | DIASTOLIC BLOOD PRESSURE: 80 MMHG

## 2024-06-12 DIAGNOSIS — E78.00 PURE HYPERCHOLESTEROLEMIA: ICD-10-CM

## 2024-06-12 DIAGNOSIS — R20.0 NUMBNESS AND TINGLING OF BOTH FEET: ICD-10-CM

## 2024-06-12 DIAGNOSIS — K59.09 CHRONIC CONSTIPATION: ICD-10-CM

## 2024-06-12 DIAGNOSIS — R20.2 NUMBNESS AND TINGLING OF BOTH FEET: ICD-10-CM

## 2024-06-12 DIAGNOSIS — E89.0 POSTABLATIVE HYPOTHYROIDISM: ICD-10-CM

## 2024-06-12 DIAGNOSIS — I10 ESSENTIAL HYPERTENSION: Primary | ICD-10-CM

## 2024-06-12 PROCEDURE — 99214 OFFICE O/P EST MOD 30 MIN: CPT | Performed by: INTERNAL MEDICINE

## 2024-06-12 PROCEDURE — 3079F DIAST BP 80-89 MM HG: CPT | Performed by: INTERNAL MEDICINE

## 2024-06-12 PROCEDURE — 3075F SYST BP GE 130 - 139MM HG: CPT | Performed by: INTERNAL MEDICINE

## 2024-06-12 ASSESSMENT — PATIENT HEALTH QUESTIONNAIRE - PHQ9
2. FEELING DOWN, DEPRESSED OR HOPELESS: NOT AT ALL
SUM OF ALL RESPONSES TO PHQ QUESTIONS 1-9: 0
SUM OF ALL RESPONSES TO PHQ QUESTIONS 1-9: 0
SUM OF ALL RESPONSES TO PHQ9 QUESTIONS 1 & 2: 0
1. LITTLE INTEREST OR PLEASURE IN DOING THINGS: NOT AT ALL
SUM OF ALL RESPONSES TO PHQ QUESTIONS 1-9: 0
SUM OF ALL RESPONSES TO PHQ QUESTIONS 1-9: 0

## 2024-06-12 ASSESSMENT — ENCOUNTER SYMPTOMS
SHORTNESS OF BREATH: 0
ALLERGIC/IMMUNOLOGIC NEGATIVE: 1
CONSTIPATION: 1
ABDOMINAL PAIN: 0
RESPIRATORY NEGATIVE: 1
EYES NEGATIVE: 1

## 2024-06-12 NOTE — PROGRESS NOTES
Subjective    Martha Ji is a 63 y.o. female who presents today for the following:  Chief Complaint   Patient presents with    Follow-up    Tick Removal    Discuss Labs    Numbness     Under feet       History of Present Illness  The patient presents for evaluation of multiple medical concerns.    The patient expresses dissatisfaction with her thyroid condition, citing an episode of feeling unwell and dizzy upon awakening on Monday. She sought medical attention from a nurse practitioner who advised her to administer her thyroid medication one hour prior to breakfast.  She has not been taking levothyroxine as directed.  TSH is up to 12.8.    She is still taking the blood pressure medicine, hydrochlorothiazide and Lipitor for cholesterol. She takes Linzess for constipation as needed. She uses creams for acne on her face. She has been forgetting to take Lipitor at times. She was told to exercise 150 hours a week.     She was bitten by a tick while working in the yard on Friday.  No rash.  No fever or other related symptoms.      She has been feeling numbness in the ball of her foot for a while.  No other focal neurological issues.  No trauma.    PMH/PSH/Allergies/Social History/medication list and most recent studies reviewed with patient.    Reports compliance with medications and diet. Trying to be active physically to control weight. Reports no other new c/o.     Social History     Tobacco Use   Smoking Status Never   Smokeless Tobacco Never     Social History     Substance and Sexual Activity   Alcohol Use Yes    Comment: 2 month, socially         Past Medical History:   Diagnosis Date    Fibroids     Hypercholesterolemia     Hypertension     Hypothyroid     Inguinal hernia bilateral, non-recurrent     Menopause     Pregnancy     x 3 all vaginal births       No Known Allergies     Current Outpatient Medications on File Prior to Visit   Medication Sig Dispense Refill    losartan-hydroCHLOROthiazide (HYZAAR)

## 2024-06-12 NOTE — PROGRESS NOTES
Chief Complaint   Patient presents with    Follow-up    Tick Removal    Discuss Labs    Numbness     Under feet     \"Have you been to the ER, urgent care clinic since your last visit?  Hospitalized since your last visit?\"    NO    “Have you seen or consulted any other health care providers outside of [facility] since your last visit?”    NO            Click Here for Release of Records Request

## 2024-08-02 ENCOUNTER — OFFICE VISIT (OUTPATIENT)
Age: 64
End: 2024-08-02
Payer: COMMERCIAL

## 2024-08-02 VITALS — SYSTOLIC BLOOD PRESSURE: 130 MMHG | BODY MASS INDEX: 28.91 KG/M2 | DIASTOLIC BLOOD PRESSURE: 82 MMHG | WEIGHT: 163.2 LBS

## 2024-08-02 DIAGNOSIS — R10.2 PELVIC PAIN: Primary | ICD-10-CM

## 2024-08-02 PROCEDURE — G8427 DOCREV CUR MEDS BY ELIG CLIN: HCPCS | Performed by: OBSTETRICS & GYNECOLOGY

## 2024-08-02 PROCEDURE — 3075F SYST BP GE 130 - 139MM HG: CPT | Performed by: OBSTETRICS & GYNECOLOGY

## 2024-08-02 PROCEDURE — 1036F TOBACCO NON-USER: CPT | Performed by: OBSTETRICS & GYNECOLOGY

## 2024-08-02 PROCEDURE — 3017F COLORECTAL CA SCREEN DOC REV: CPT | Performed by: OBSTETRICS & GYNECOLOGY

## 2024-08-02 PROCEDURE — G8417 CALC BMI ABV UP PARAM F/U: HCPCS | Performed by: OBSTETRICS & GYNECOLOGY

## 2024-08-02 PROCEDURE — 99213 OFFICE O/P EST LOW 20 MIN: CPT | Performed by: OBSTETRICS & GYNECOLOGY

## 2024-08-02 PROCEDURE — 3079F DIAST BP 80-89 MM HG: CPT | Performed by: OBSTETRICS & GYNECOLOGY

## 2024-08-02 NOTE — PROGRESS NOTES
MEASURING 33  X 24 MM. RT LAT INTRAMURAL MEASURING 45 X 40 MM. POSTERIOR CALCIFIED SUBSEROSAL MEASURING 31 X  25 MM. ML SUBMUCOSAL VS. INTRACAVITARY MEASURING 11 X 9 MM.  THE ENDOMETRIUM MEASURES 26MM IN THICKNESS (but suspected intracavitary fibroid, and no PMB). NO MASSES OR ABNORMALITIES ARE SEEN.  RIGHT OVARY APPEARS WNL.  LEFT OVARY NOT SEEN DUE TO BOWEL GAS. LT ADN APPEARS WNL.  NO FREE FLUID IS SEEN IN THE CDS.     Ob/Gyn Hx:   A0 - 3   Josee-57  VMS- denies  Vag dryness- yes, uses lubricants  STI- denies  ?SA-yes,      Health maintenance:  Pap- 23 NILM/HPV neg, 12/10/19 NILM HPV Neg  Mammo- 23 B1, 22 B1; 21 B1, 20 B1  Colonoscopy-  per pt, hx of polyps    1. Have you been to the ER, urgent care clinic, or hospitalized since your last visit?No    2. Have you seen or consulted any other health care providers outside of the [facility] since your last visit? No    Sara Davenport LPN  
present  Liver and spleen: no hepatomegaly present, spleen not palpable  Hernias: no hernias identified    Neurologic/Psychiatric  Mental Status:  Orientation: grossly oriented to person, place and time  Mood and Affect: mood normal, affect appropriate      Assessment/Plan:  63 y.o. presenting for follow up of left sided pelvic pains. +multifibroid uterus. +IBS.    -US findings reviewed with pt today, reassurance of stability in size of fibroids and overall benign findings  -encouraged follow up with gastroenterology given suspicion for GI etiology  -if no improvement after evaluation by GI, discussed with pt would consider CT A/P    RTC for AE or sooner preli Swift MD  8/2/2024  10:08 AM

## 2024-08-23 DIAGNOSIS — I10 ESSENTIAL HYPERTENSION: ICD-10-CM

## 2024-08-26 RX ORDER — LOSARTAN POTASSIUM AND HYDROCHLOROTHIAZIDE 25; 100 MG/1; MG/1
1 TABLET ORAL DAILY
Qty: 90 TABLET | Refills: 0 | Status: SHIPPED | OUTPATIENT
Start: 2024-08-26 | End: 2024-08-28 | Stop reason: SDUPTHER

## 2024-08-26 RX ORDER — ATORVASTATIN CALCIUM 10 MG/1
TABLET, FILM COATED ORAL
Qty: 90 TABLET | Refills: 0 | Status: SHIPPED | OUTPATIENT
Start: 2024-08-26

## 2024-08-28 DIAGNOSIS — I10 ESSENTIAL HYPERTENSION: ICD-10-CM

## 2024-08-28 RX ORDER — LOSARTAN POTASSIUM AND HYDROCHLOROTHIAZIDE 25; 100 MG/1; MG/1
1 TABLET ORAL DAILY
Qty: 90 TABLET | Refills: 0 | Status: SHIPPED | OUTPATIENT
Start: 2024-08-28

## 2024-08-30 DIAGNOSIS — K59.09 CHRONIC CONSTIPATION: ICD-10-CM

## 2024-09-05 ENCOUNTER — TELEPHONE (OUTPATIENT)
Age: 64
End: 2024-09-05

## 2024-09-05 DIAGNOSIS — E89.0 POSTABLATIVE HYPOTHYROIDISM: Primary | ICD-10-CM

## 2024-09-05 LAB
CHOLEST SERPL-MCNC: 186 MG/DL (ref 100–199)
HDLC SERPL-MCNC: 61 MG/DL
IMP & REVIEW OF LAB RESULTS: NORMAL
LDLC SERPL CALC-MCNC: 115 MG/DL (ref 0–99)
T4 FREE SERPL-MCNC: 2.68 NG/DL (ref 0.82–1.77)
TRIGL SERPL-MCNC: 51 MG/DL (ref 0–149)
TSH SERPL DL<=0.005 MIU/L-ACNC: <0.005 UIU/ML (ref 0.45–4.5)
VIT B12 SERPL-MCNC: 1140 PG/ML (ref 232–1245)
VLDLC SERPL CALC-MCNC: 10 MG/DL (ref 5–40)

## 2024-09-05 NOTE — TELEPHONE ENCOUNTER
Martha Ji was called and verbalized understanding on note below.     ----- Message from Dr. Seng Nevarez DO sent at 9/5/2024 12:52 PM EDT -----  Cholesterol looks much better  Continue Lipitor and watch diet    TSH is very low  Skip thyroid medicine on Sundays  Recheck TSH, free T4 in 6 weeks    B12 level is normal

## 2024-10-05 DIAGNOSIS — E89.0 POSTABLATIVE HYPOTHYROIDISM: ICD-10-CM

## 2024-10-29 NOTE — H&P PST ADULT - HEMATOLOGY/LYMPHATICS
Survey:     You may be receiving a survey from [facility] [employer] regarding your visit today.     You may get this in the mail, through your MyChart or in your email.      Please complete the survey to enable us to provide the highest quality of care to you and your family. Please also, mention our names.     If you cannot score us as very good (5 Stars) on any question, please feel free to call the office to discuss how we could have made your experience exceptional.      Thank You!        Dr. Koroma, MD Gordon, RUBINA Leyva, AYALA Deleon, APRN DEBBY Grant, AYALA Arreaga, CMA       Assessment & Plan  Acute bacterial sinusitis   Take Augmentin 875 mg two times a day x 10 days.        Orders:    amoxicillin-clavulanate (AUGMENTIN) 875-125 MG per tablet; Take 1 tablet by mouth 2 times daily for 10 days    Need for influenza vaccination   Isabel was given an influenza vaccine today.      Orders:    Influenza, FLUCELVAX Trivalent, (age 6 mo+) IM, Preservative Free, 0.5mL    Benign essential HTN   Controlled on Amlodipine and HCTZ.  No change in medication.    Orders:    Comprehensive Metabolic Panel; Future    Depression with anxiety   Controlled on Zoloft.  No change in medication.          Gastroesophageal reflux disease without esophagitis   Controlled on Prilosec.  No change in medication.         Acquired hypothyroidism   Controlled on Levothyroxine. No change in medication.         Hypercholesteremia   Controlled on Lipitor.  No change in medication.    Isabel was instructed to follow up in the clinic in 6 months for check up or as needed with any medical issues.            Orders:    Comprehensive Metabolic Panel; Future    Lipid Panel; Future    Isabel was instructed to follow up in the clinic in 6 months for check up or as needed with any medical issues.   details…

## 2024-12-04 LAB
CHOLEST SERPL-MCNC: 191 MG/DL (ref 100–199)
HDLC SERPL-MCNC: 66 MG/DL
IMP & REVIEW OF LAB RESULTS: NORMAL
LDLC SERPL CALC-MCNC: 114 MG/DL (ref 0–99)
T4 FREE SERPL-MCNC: 2.22 NG/DL (ref 0.82–1.77)
TRIGL SERPL-MCNC: 61 MG/DL (ref 0–149)
TSH SERPL DL<=0.005 MIU/L-ACNC: 0.01 UIU/ML (ref 0.45–4.5)
VIT B12 SERPL-MCNC: 848 PG/ML (ref 232–1245)
VLDLC SERPL CALC-MCNC: 11 MG/DL (ref 5–40)

## 2024-12-12 ENCOUNTER — OFFICE VISIT (OUTPATIENT)
Age: 64
End: 2024-12-12

## 2024-12-12 VITALS
HEIGHT: 63 IN | BODY MASS INDEX: 28.88 KG/M2 | OXYGEN SATURATION: 98 % | SYSTOLIC BLOOD PRESSURE: 134 MMHG | HEART RATE: 65 BPM | WEIGHT: 163 LBS | DIASTOLIC BLOOD PRESSURE: 80 MMHG

## 2024-12-12 DIAGNOSIS — E78.00 PURE HYPERCHOLESTEROLEMIA: ICD-10-CM

## 2024-12-12 DIAGNOSIS — K59.09 CHRONIC CONSTIPATION: ICD-10-CM

## 2024-12-12 DIAGNOSIS — I10 ESSENTIAL HYPERTENSION: ICD-10-CM

## 2024-12-12 DIAGNOSIS — E89.0 POSTABLATIVE HYPOTHYROIDISM: ICD-10-CM

## 2024-12-12 DIAGNOSIS — Z23 FLU VACCINE NEED: ICD-10-CM

## 2024-12-12 DIAGNOSIS — E89.0 POSTABLATIVE HYPOTHYROIDISM: Primary | ICD-10-CM

## 2024-12-12 DIAGNOSIS — Z12.11 ENCOUNTER FOR SCREENING COLONOSCOPY: ICD-10-CM

## 2024-12-12 RX ORDER — LEVOTHYROXINE SODIUM 150 UG/1
TABLET ORAL
Qty: 90 TABLET | Refills: 1 | Status: SHIPPED | OUTPATIENT
Start: 2024-12-12

## 2024-12-21 ASSESSMENT — ENCOUNTER SYMPTOMS
RESPIRATORY NEGATIVE: 1
CONSTIPATION: 1
SHORTNESS OF BREATH: 0
ABDOMINAL PAIN: 1
ALLERGIC/IMMUNOLOGIC NEGATIVE: 1
EYES NEGATIVE: 1

## 2024-12-21 NOTE — PROGRESS NOTES
Chief Complaint   Patient presents with    Follow-up    Hypertension     \"Have you been to the ER, urgent care clinic since your last visit?  Hospitalized since your last visit?\"    No    “Have you seen or consulted any other health care providers outside our system since your last visit?”    NO             
Exam  Constitutional:       General: She is not in acute distress.     Appearance: Normal appearance.   HENT:      Head: Normocephalic and atraumatic.      Mouth/Throat:      Mouth: Mucous membranes are moist.      Pharynx: Oropharynx is clear.   Eyes:      Conjunctiva/sclera: Conjunctivae normal.   Neck:      Vascular: No carotid bruit.   Cardiovascular:      Rate and Rhythm: Normal rate and regular rhythm.      Pulses: Normal pulses.      Heart sounds: Normal heart sounds. No murmur heard.  Pulmonary:      Effort: No respiratory distress.      Breath sounds: Normal breath sounds. No wheezing or rales.   Abdominal:      General: Bowel sounds are normal. There is no distension.      Palpations: Abdomen is soft. There is no mass.      Tenderness: There is no abdominal tenderness. There is no right CVA tenderness or left CVA tenderness.      Hernia: No hernia is present.   Musculoskeletal:      Cervical back: Normal range of motion and neck supple.      Right lower leg: No edema.      Left lower leg: No edema.   Lymphadenopathy:      Cervical: No cervical adenopathy.   Skin:     General: Skin is warm and dry.      Findings: No rash.   Neurological:      General: No focal deficit present.      Mental Status: She is alert and oriented to person, place, and time. Mental status is at baseline.      Gait: Gait normal.   Psychiatric:         Mood and Affect: Mood normal.         Behavior: Behavior normal.            Assessment & Plan   Diagnosis Orders   1. Postablative hypothyroidism  Decrease Levoxyl to 150 mcg daily  TSH in 6-8 weeks      2. Essential hypertension  Monitor BP at home with goal of 140/90 or less   Stable chronic condition.  Continue current treatment/medications.        3. Pure hypercholesterolemia  Stable on current medication/regimen        4. Chronic constipation  Stable on current medication/regimen  Cont Linzess      5. Flu vaccine need        6. Encounter for screening colonoscopy  HEENA - Dipesh

## 2025-01-10 ENCOUNTER — HOSPITAL ENCOUNTER (OUTPATIENT)
Facility: HOSPITAL | Age: 65
Discharge: HOME | End: 2025-01-13
Attending: OBSTETRICS & GYNECOLOGY
Payer: COMMERCIAL

## 2025-01-10 VITALS — WEIGHT: 165 LBS | BODY MASS INDEX: 29.23 KG/M2 | HEIGHT: 63 IN

## 2025-01-10 DIAGNOSIS — Z12.31 ENCOUNTER FOR SCREENING MAMMOGRAM FOR MALIGNANT NEOPLASM OF BREAST: ICD-10-CM

## 2025-01-10 PROCEDURE — 77063 BREAST TOMOSYNTHESIS BI: CPT

## 2025-02-14 DIAGNOSIS — I10 ESSENTIAL HYPERTENSION: ICD-10-CM

## 2025-02-14 RX ORDER — ATORVASTATIN CALCIUM 10 MG/1
TABLET, FILM COATED ORAL
Qty: 90 TABLET | Refills: 1 | Status: SHIPPED | OUTPATIENT
Start: 2025-02-14

## 2025-02-14 RX ORDER — LOSARTAN POTASSIUM AND HYDROCHLOROTHIAZIDE 25; 100 MG/1; MG/1
1 TABLET ORAL DAILY
Qty: 90 TABLET | Refills: 0 | Status: SHIPPED | OUTPATIENT
Start: 2025-02-14

## 2025-02-14 NOTE — TELEPHONE ENCOUNTER
Lov:12/12/2024  Nov:06/12/2025    [facility] [address]        losartan-hydroCHLOROthiazide (HYZAAR) 100-25 MG per tablet     Quantity: 90   Refills: 3

## 2025-02-20 ENCOUNTER — TELEMEDICINE (OUTPATIENT)
Age: 65
End: 2025-02-20
Payer: COMMERCIAL

## 2025-02-20 ENCOUNTER — TELEPHONE (OUTPATIENT)
Age: 65
End: 2025-02-20

## 2025-02-20 DIAGNOSIS — R11.2 MILD NAUSEA AND VOMITING: ICD-10-CM

## 2025-02-20 DIAGNOSIS — R42 DIZZINESS: Primary | ICD-10-CM

## 2025-02-20 DIAGNOSIS — K59.09 CHRONIC CONSTIPATION: ICD-10-CM

## 2025-02-20 PROCEDURE — 3017F COLORECTAL CA SCREEN DOC REV: CPT | Performed by: INTERNAL MEDICINE

## 2025-02-20 PROCEDURE — 1036F TOBACCO NON-USER: CPT | Performed by: INTERNAL MEDICINE

## 2025-02-20 PROCEDURE — 99213 OFFICE O/P EST LOW 20 MIN: CPT | Performed by: INTERNAL MEDICINE

## 2025-02-20 PROCEDURE — G8417 CALC BMI ABV UP PARAM F/U: HCPCS | Performed by: INTERNAL MEDICINE

## 2025-02-20 PROCEDURE — G8427 DOCREV CUR MEDS BY ELIG CLIN: HCPCS | Performed by: INTERNAL MEDICINE

## 2025-02-20 RX ORDER — MECLIZINE HYDROCHLORIDE 25 MG/1
25 TABLET ORAL 3 TIMES DAILY PRN
Qty: 30 TABLET | Refills: 0 | Status: SHIPPED | OUTPATIENT
Start: 2025-02-20 | End: 2025-03-02

## 2025-02-20 SDOH — ECONOMIC STABILITY: FOOD INSECURITY: WITHIN THE PAST 12 MONTHS, YOU WORRIED THAT YOUR FOOD WOULD RUN OUT BEFORE YOU GOT MONEY TO BUY MORE.: NEVER TRUE

## 2025-02-20 SDOH — ECONOMIC STABILITY: FOOD INSECURITY: WITHIN THE PAST 12 MONTHS, THE FOOD YOU BOUGHT JUST DIDN'T LAST AND YOU DIDN'T HAVE MONEY TO GET MORE.: NEVER TRUE

## 2025-02-20 ASSESSMENT — PATIENT HEALTH QUESTIONNAIRE - PHQ9
SUM OF ALL RESPONSES TO PHQ QUESTIONS 1-9: 0
1. LITTLE INTEREST OR PLEASURE IN DOING THINGS: NOT AT ALL
SUM OF ALL RESPONSES TO PHQ QUESTIONS 1-9: 0
2. FEELING DOWN, DEPRESSED OR HOPELESS: NOT AT ALL
SUM OF ALL RESPONSES TO PHQ QUESTIONS 1-9: 0
SUM OF ALL RESPONSES TO PHQ9 QUESTIONS 1 & 2: 0
SUM OF ALL RESPONSES TO PHQ QUESTIONS 1-9: 0

## 2025-02-20 ASSESSMENT — ENCOUNTER SYMPTOMS
NAUSEA: 1
CONSTIPATION: 1
RESPIRATORY NEGATIVE: 1

## 2025-02-20 NOTE — TELEPHONE ENCOUNTER
Lov:12/12/2024  Nov:06/12/2025    [facility]    Pt called in about not feeling well. Pt states she is feeling lightheaded and sick to her stomach. Pt called in asking to speak with a nurse or provider. Pt is okay with having a vv or over the phone appointment.

## 2025-02-20 NOTE — PROGRESS NOTES
2025    TELEHEALTH EVALUATION -- Audio/Visual    HPI:    Martha Ji (:  1960) has requested an audio/video evaluation for the following concern(s):      History of Present Illness  The patient presents via virtual visit for evaluation of dizziness, nausea, vomiting, and constipation.    She experienced mild lightheadedness and dizziness yesterday, which she initially disregarded. As the day progressed, she developed nausea, even though she had not consumed much food. After eating at [employer], she felt a combination of lightheadedness and dizziness, prompting her to lie down. She then experienced a sensation of impending vomiting, which culminated in actual vomiting, and subsequently went to bed around 7:30 PM. Upon waking this morning, she continued to feel lightheaded and dizzy. She consumed some orange juice and checked her blood sugar, suspecting it might be low due to previous episodes of hypoglycemia. Her blood sugar was 98 about 10 to 15 minutes after drinking the orange juice. Her blood pressure was also low, measuring 108/70 on her home machine, which is lower than her usual range. She attempted to eat a piece of toast and drink half a glass of orange juice, but vomited again. She reports feeling slightly congested but does not have a cold.     She has been advised to return for thyroid blood work as her medication dosage was reduced in December. She admits to inadequate hydration, having only consumed two 16-ounce bottles of water yesterday. She also acknowledges poor dietary habits, including constipation over the past week. She reports no fevers, chest pain, coughing, or body aches. She occasionally experiences arm or leg pain with exertion. She has a history of vertigo and has previously taken meclizine. She reports feeling woozy when moving around.    She has been constipated for the last week. She has been taking Linzess as needed, which she finds effective without causing

## 2025-02-20 NOTE — PROGRESS NOTES
Chief Complaint   Patient presents with    Nausea     lighth    Dizziness    Vomiting     \"Have you been to the ER, urgent care clinic since your last visit?  Hospitalized since your last visit?\"    NO    “Have you seen or consulted any other health care providers outside our system since your last visit?”    NO

## 2025-05-19 DIAGNOSIS — I10 ESSENTIAL HYPERTENSION: ICD-10-CM

## 2025-05-20 DIAGNOSIS — E03.9 ACQUIRED HYPOTHYROIDISM: Primary | ICD-10-CM

## 2025-05-20 RX ORDER — LOSARTAN POTASSIUM AND HYDROCHLOROTHIAZIDE 25; 100 MG/1; MG/1
1 TABLET ORAL DAILY
Qty: 90 TABLET | Refills: 1 | Status: SHIPPED | OUTPATIENT
Start: 2025-05-20

## 2025-05-20 RX ORDER — LEVOTHYROXINE SODIUM 150 UG/1
TABLET ORAL
Qty: 90 TABLET | Refills: 1 | Status: SHIPPED | OUTPATIENT
Start: 2025-05-20

## 2025-05-20 NOTE — TELEPHONE ENCOUNTER
[facility] - [place], [place]  [address] - P [phone] - F [phone]      levothyroxine (SYNTHROID) 150 MCG tablet     02/20/2025 06/12/2025

## 2025-05-21 LAB — TSH SERPL DL<=0.005 MIU/L-ACNC: 0.08 UIU/ML (ref 0.45–4.5)

## 2025-05-23 ENCOUNTER — RESULTS FOLLOW-UP (OUTPATIENT)
Age: 65
End: 2025-05-23

## 2025-05-23 DIAGNOSIS — E03.9 ACQUIRED HYPOTHYROIDISM: Primary | ICD-10-CM

## 2025-05-28 RX ORDER — LEVOTHYROXINE SODIUM 125 UG/1
125 TABLET ORAL DAILY
Qty: 30 TABLET | Refills: 0 | Status: SHIPPED | OUTPATIENT
Start: 2025-05-28

## 2025-06-11 DIAGNOSIS — E03.9 ACQUIRED HYPOTHYROIDISM: ICD-10-CM

## 2025-06-12 ENCOUNTER — OFFICE VISIT (OUTPATIENT)
Age: 65
End: 2025-06-12

## 2025-06-12 VITALS
WEIGHT: 163 LBS | RESPIRATION RATE: 18 BRPM | OXYGEN SATURATION: 98 % | SYSTOLIC BLOOD PRESSURE: 136 MMHG | BODY MASS INDEX: 28.87 KG/M2 | HEART RATE: 61 BPM | DIASTOLIC BLOOD PRESSURE: 88 MMHG

## 2025-06-12 DIAGNOSIS — G89.29 CHRONIC LEFT SHOULDER PAIN: ICD-10-CM

## 2025-06-12 DIAGNOSIS — R23.4 SKIN TEXTURE CHANGES: ICD-10-CM

## 2025-06-12 DIAGNOSIS — Z00.00 WELL ADULT EXAM: ICD-10-CM

## 2025-06-12 DIAGNOSIS — M25.512 CHRONIC LEFT SHOULDER PAIN: ICD-10-CM

## 2025-06-12 DIAGNOSIS — R20.0 NUMBNESS AND TINGLING OF BOTH FEET: ICD-10-CM

## 2025-06-12 DIAGNOSIS — R20.2 NUMBNESS AND TINGLING OF BOTH FEET: ICD-10-CM

## 2025-06-12 DIAGNOSIS — I10 ESSENTIAL HYPERTENSION: Primary | ICD-10-CM

## 2025-06-12 RX ORDER — LOSARTAN POTASSIUM 100 MG/1
100 TABLET ORAL DAILY
Qty: 90 TABLET | Refills: 1 | Status: SHIPPED | OUTPATIENT
Start: 2025-06-12

## 2025-06-12 RX ORDER — CLINDAMYCIN PHOSPHATE 10 UG/ML
LOTION TOPICAL
Qty: 60 G | Refills: 1 | Status: SHIPPED | OUTPATIENT
Start: 2025-06-12

## 2025-06-12 ASSESSMENT — ENCOUNTER SYMPTOMS
CHEST TIGHTNESS: 0
RESPIRATORY NEGATIVE: 1
GASTROINTESTINAL NEGATIVE: 1

## 2025-06-12 NOTE — PROGRESS NOTES
Have you been to the ER, urgent care clinic since your last visit?  Hospitalized since your last visit?   NO    Have you seen or consulted any other health care providers outside our system since your last visit?   NO             
effects in detail   I answered all patient questions and concerns  Labs and testing done and/or upcoming labs/test were reviewed and discussed   Reviewed and discussed daily activity, exercise and diet      Return in about 6 months (around 12/12/2025) for follow up if symptoms persist, follow up for routine visit or before if needed.     Allie Elena, BHAVYA - NP      The patient (or guardian, if applicable) and other individuals in attendance with the patient were advised that Artificial Intelligence will be utilized during this visit to record and process the conversation to generate a clinical note. The patient (or guardian, if applicable) and other individuals in attendance at the appointment consented to the use of AI, including the recording.

## 2025-07-31 DIAGNOSIS — E03.9 ACQUIRED HYPOTHYROIDISM: ICD-10-CM

## 2025-08-01 RX ORDER — LEVOTHYROXINE SODIUM 125 UG/1
125 TABLET ORAL DAILY
Qty: 90 TABLET | Refills: 0 | Status: SHIPPED | OUTPATIENT
Start: 2025-08-01